# Patient Record
Sex: FEMALE | Race: WHITE | Employment: FULL TIME | URBAN - METROPOLITAN AREA
[De-identification: names, ages, dates, MRNs, and addresses within clinical notes are randomized per-mention and may not be internally consistent; named-entity substitution may affect disease eponyms.]

---

## 2022-05-20 ENCOUNTER — HOSPITAL ENCOUNTER (OUTPATIENT)
Age: 32
Discharge: HOME | End: 2022-05-20
Payer: COMMERCIAL

## 2022-05-20 VITALS
OXYGEN SATURATION: 97 % | DIASTOLIC BLOOD PRESSURE: 70 MMHG | TEMPERATURE: 98 F | SYSTOLIC BLOOD PRESSURE: 120 MMHG | BODY MASS INDEX: 29.44 KG/M2 | RESPIRATION RATE: 19 BRPM | HEART RATE: 83 BPM | HEIGHT: 62 IN | WEIGHT: 160 LBS

## 2022-05-20 DIAGNOSIS — J06.9 VIRAL URI: Primary | ICD-10-CM

## 2022-05-20 LAB
S PYO AG THROAT QL: NEGATIVE
SARS-COV-2 RNA RESP QL NAA+PROBE: NOT DETECTED

## 2022-05-20 PROCEDURE — 99203 OFFICE O/P NEW LOW 30 MIN: CPT | Performed by: NURSE PRACTITIONER

## 2022-05-20 PROCEDURE — U0002 COVID-19 LAB TEST NON-CDC: HCPCS | Performed by: NURSE PRACTITIONER

## 2022-05-20 PROCEDURE — 87880 STREP A ASSAY W/OPTIC: CPT | Performed by: NURSE PRACTITIONER

## 2022-05-20 RX ORDER — DEXAMETHASONE SODIUM PHOSPHATE 4 MG/ML
10 INJECTION, SOLUTION INTRA-ARTICULAR; INTRALESIONAL; INTRAMUSCULAR; INTRAVENOUS; SOFT TISSUE ONCE
Status: COMPLETED | OUTPATIENT
Start: 2022-05-20 | End: 2022-05-20

## 2022-05-20 NOTE — ED INITIAL ASSESSMENT (HPI)
Sore throat w sinus pressure, congestion, post nasal drip w green secretion. Left lower jaw pain - PTA, having CT next week.

## 2022-09-10 ENCOUNTER — HOSPITAL ENCOUNTER (OUTPATIENT)
Dept: CT IMAGING | Facility: HOSPITAL | Age: 32
Discharge: HOME | End: 2022-09-10
Attending: OTOLARYNGOLOGY
Payer: COMMERCIAL

## 2022-09-10 DIAGNOSIS — J32.0 MAXILLARY SINUSITIS, CHRONIC: ICD-10-CM

## 2022-09-10 PROCEDURE — 70486 CT MAXILLOFACIAL W/O DYE: CPT | Performed by: OTOLARYNGOLOGY

## 2022-10-24 ENCOUNTER — OFFICE VISIT (OUTPATIENT)
Dept: INTERNAL MEDICINE CLINIC | Facility: CLINIC | Age: 32
End: 2022-10-24
Payer: COMMERCIAL

## 2022-10-24 VITALS
HEIGHT: 62 IN | DIASTOLIC BLOOD PRESSURE: 66 MMHG | BODY MASS INDEX: 31.28 KG/M2 | HEART RATE: 80 BPM | WEIGHT: 170 LBS | TEMPERATURE: 98 F | OXYGEN SATURATION: 98 % | SYSTOLIC BLOOD PRESSURE: 110 MMHG | RESPIRATION RATE: 16 BRPM

## 2022-10-24 DIAGNOSIS — E55.9 VITAMIN D DEFICIENCY: ICD-10-CM

## 2022-10-24 DIAGNOSIS — Z00.00 ROUTINE PHYSICAL EXAMINATION: Primary | ICD-10-CM

## 2022-10-24 DIAGNOSIS — F43.10 PTSD (POST-TRAUMATIC STRESS DISORDER): ICD-10-CM

## 2022-10-24 DIAGNOSIS — Z00.00 LABORATORY EXAM ORDERED AS PART OF ROUTINE GENERAL MEDICAL EXAMINATION: ICD-10-CM

## 2022-10-24 DIAGNOSIS — K21.9 GASTROESOPHAGEAL REFLUX DISEASE, UNSPECIFIED WHETHER ESOPHAGITIS PRESENT: ICD-10-CM

## 2022-10-24 PROBLEM — O24.419 GESTATIONAL DIABETES MELLITUS (HCC): Status: ACTIVE | Noted: 2022-04-06

## 2022-10-24 PROBLEM — O24.419 GESTATIONAL DIABETES MELLITUS: Status: ACTIVE | Noted: 2022-04-06

## 2022-10-24 PROCEDURE — 3074F SYST BP LT 130 MM HG: CPT | Performed by: PHYSICIAN ASSISTANT

## 2022-10-24 PROCEDURE — 3008F BODY MASS INDEX DOCD: CPT | Performed by: PHYSICIAN ASSISTANT

## 2022-10-24 PROCEDURE — 3078F DIAST BP <80 MM HG: CPT | Performed by: PHYSICIAN ASSISTANT

## 2022-10-24 PROCEDURE — 99385 PREV VISIT NEW AGE 18-39: CPT | Performed by: PHYSICIAN ASSISTANT

## 2022-10-24 RX ORDER — PANTOPRAZOLE SODIUM 40 MG/1
40 TABLET, DELAYED RELEASE ORAL
Qty: 30 TABLET | Refills: 1 | Status: SHIPPED | OUTPATIENT
Start: 2022-10-24

## 2022-10-24 RX ORDER — LEVOCETIRIZINE DIHYDROCHLORIDE 5 MG/1
TABLET, FILM COATED ORAL
COMMUNITY
Start: 2022-10-18

## 2022-10-24 NOTE — PATIENT INSTRUCTIONS
Have labs drawn, fasting 8-10 hours prior (water before is ok).    Start pantoprazole, take once a day before breakfast

## 2022-11-18 ENCOUNTER — MED REC SCAN ONLY (OUTPATIENT)
Dept: INTERNAL MEDICINE CLINIC | Facility: CLINIC | Age: 32
End: 2022-11-18

## 2022-11-19 ENCOUNTER — TELEPHONE (OUTPATIENT)
Dept: INTERNAL MEDICINE CLINIC | Facility: CLINIC | Age: 32
End: 2022-11-19

## 2022-11-19 DIAGNOSIS — U07.1 COVID: Primary | ICD-10-CM

## 2022-11-20 NOTE — TELEPHONE ENCOUNTER
Received call from the patient. She tested positive with Covid today. Symptom onset was two days ago. Experiencing  Headache, fever, body aches, cough, congestion. Paxlovid X 5 days prescribed. Discussed to take antihistamine, Mucinex-D, Flonase, Tylenol. Quarantine time frame discussed. Pt aware if develops SOB, CP to proceed to ED.

## 2022-11-21 ENCOUNTER — TELEPHONE (OUTPATIENT)
Dept: INTERNAL MEDICINE CLINIC | Facility: CLINIC | Age: 32
End: 2022-11-21

## 2022-11-21 NOTE — TELEPHONE ENCOUNTER
Spoke with Bill on 11/19    + covid home test    [facility]    Needs work note

## 2022-12-06 DIAGNOSIS — K21.9 GASTROESOPHAGEAL REFLUX DISEASE, UNSPECIFIED WHETHER ESOPHAGITIS PRESENT: Primary | ICD-10-CM

## 2022-12-06 RX ORDER — PANTOPRAZOLE SODIUM 40 MG/1
TABLET, DELAYED RELEASE ORAL
Qty: 30 TABLET | Refills: 1 | Status: SHIPPED | OUTPATIENT
Start: 2022-12-06

## 2022-12-06 NOTE — TELEPHONE ENCOUNTER
Pantoprazole 40 mg   Last time medication was refilled 11/14/22  Quantity and # of refills 30 tab 1 refill  Last OV 10/24/22 annual exam  Next OV Due 10/23 annual exam

## 2022-12-10 ENCOUNTER — APPOINTMENT (OUTPATIENT)
Dept: GENERAL RADIOLOGY | Age: 32
End: 2022-12-10
Attending: PHYSICIAN ASSISTANT
Payer: COMMERCIAL

## 2022-12-10 ENCOUNTER — HOSPITAL ENCOUNTER (OUTPATIENT)
Age: 32
Discharge: HOME | End: 2022-12-10
Payer: COMMERCIAL

## 2022-12-10 VITALS
OXYGEN SATURATION: 99 % | DIASTOLIC BLOOD PRESSURE: 83 MMHG | RESPIRATION RATE: 16 BRPM | SYSTOLIC BLOOD PRESSURE: 111 MMHG | HEART RATE: 76 BPM

## 2022-12-10 DIAGNOSIS — S60.021A CONTUSION OF RIGHT INDEX FINGER WITHOUT DAMAGE TO NAIL, INITIAL ENCOUNTER: Primary | ICD-10-CM

## 2022-12-10 PROCEDURE — 73140 X-RAY EXAM OF FINGER(S): CPT | Performed by: PHYSICIAN ASSISTANT

## 2022-12-10 PROCEDURE — A4570 SPLINT: HCPCS | Performed by: PHYSICIAN ASSISTANT

## 2022-12-10 PROCEDURE — 99212 OFFICE O/P EST SF 10 MIN: CPT | Performed by: PHYSICIAN ASSISTANT

## 2022-12-10 NOTE — DISCHARGE INSTRUCTIONS
Wear the splint as needed for comfort. Ice and elevate the affected area. Tylenol or ibuprofen as needed for pain. Follow up with your primary doctor. If you have new, changing or worsening symptoms, please go directly to the ER.

## 2022-12-10 NOTE — ED INITIAL ASSESSMENT (HPI)
Slammed door on right index finger one hour ago. Having tingling and severe pain. Unable to tolerate ice.

## 2023-03-27 ENCOUNTER — TELEMEDICINE (OUTPATIENT)
Dept: INTERNAL MEDICINE CLINIC | Facility: CLINIC | Age: 33
End: 2023-03-27
Payer: COMMERCIAL

## 2023-03-27 DIAGNOSIS — U07.1 COVID-19: Primary | ICD-10-CM

## 2023-03-27 PROBLEM — O24.419 GESTATIONAL DIABETES MELLITUS (HCC): Status: RESOLVED | Noted: 2022-04-06 | Resolved: 2023-03-27

## 2023-03-27 PROBLEM — O24.419 GESTATIONAL DIABETES MELLITUS: Status: RESOLVED | Noted: 2022-04-06 | Resolved: 2023-03-27

## 2023-03-27 RX ORDER — AZELASTINE 1 MG/ML
SPRAY, METERED NASAL
COMMUNITY
Start: 2023-03-25

## 2023-03-27 RX ORDER — FLUTICASONE PROPIONATE 50 MCG
SPRAY, SUSPENSION (ML) NASAL
COMMUNITY
Start: 2023-03-25

## 2023-03-27 NOTE — PATIENT INSTRUCTIONS
Continue with Sudafed. Could add antihistamine, Flonase, Mucinex.  Please continue with pushing fluids and rest.

## 2023-11-10 ENCOUNTER — OFFICE VISIT (OUTPATIENT)
Dept: INTERNAL MEDICINE CLINIC | Facility: CLINIC | Age: 33
End: 2023-11-10
Payer: COMMERCIAL

## 2023-11-10 VITALS
OXYGEN SATURATION: 98 % | HEART RATE: 78 BPM | TEMPERATURE: 98 F | DIASTOLIC BLOOD PRESSURE: 72 MMHG | BODY MASS INDEX: 30.32 KG/M2 | WEIGHT: 169 LBS | SYSTOLIC BLOOD PRESSURE: 120 MMHG | HEIGHT: 62.5 IN | RESPIRATION RATE: 16 BRPM

## 2023-11-10 DIAGNOSIS — Z00.00 LABORATORY EXAM ORDERED AS PART OF ROUTINE GENERAL MEDICAL EXAMINATION: ICD-10-CM

## 2023-11-10 DIAGNOSIS — Z13.21 ENCOUNTER FOR VITAMIN DEFICIENCY SCREENING: ICD-10-CM

## 2023-11-10 DIAGNOSIS — Z00.00 ANNUAL PHYSICAL EXAM: Primary | ICD-10-CM

## 2023-11-10 DIAGNOSIS — R10.2 PELVIC PAIN: ICD-10-CM

## 2023-11-10 PROCEDURE — 3008F BODY MASS INDEX DOCD: CPT

## 2023-11-10 PROCEDURE — 3074F SYST BP LT 130 MM HG: CPT

## 2023-11-10 PROCEDURE — 99395 PREV VISIT EST AGE 18-39: CPT

## 2023-11-10 PROCEDURE — 3078F DIAST BP <80 MM HG: CPT

## 2023-11-14 ENCOUNTER — TELEMEDICINE (OUTPATIENT)
Dept: INTERNAL MEDICINE CLINIC | Facility: CLINIC | Age: 33
End: 2023-11-14
Payer: COMMERCIAL

## 2023-11-14 DIAGNOSIS — J06.9 URI, ACUTE: Primary | ICD-10-CM

## 2023-11-14 PROCEDURE — 99213 OFFICE O/P EST LOW 20 MIN: CPT | Performed by: PHYSICIAN ASSISTANT

## 2023-11-14 NOTE — PROGRESS NOTES
Shi Toribio is a 35year old female. HPI:   This visit is conducted using Telemedicine with live, interactive video and audio. Patient consents to video visit today to discuss   Cough, congestion, sore throat, had difficulty swallowing, postnasal drainage, right ear pain, now today with chills and sweats. Robitussin not helping   Did not test for COVID yet     Current Outpatient Medications   Medication Sig Dispense Refill    levocetirizine 5 MG Oral Tab         Past Medical History:   Diagnosis Date    Allergies     MOLD    Anxiety     Gestational diabetes     Gestational diabetes mellitus 4/6/2022    PTSD (post-traumatic stress disorder)       Social History:  Social History     Socioeconomic History    Marital status:    Tobacco Use    Smoking status: Never    Smokeless tobacco: Never   Vaping Use    Vaping Use: Never used   Substance and Sexual Activity    Alcohol use: Not Currently    Drug use: Never        REVIEW OF SYSTEMS:   GENERAL HEALTH: feels well otherwise. Denies fever, chills, unintentional weight change  SKIN: denies any unusual skin lesions or rashes  RESPIRATORY: denies hemoptysis, shortness of breath with exertion, wheezing or cough  CARDIOVASCULAR: denies chest pain or palpitations, denies leg swelling  GI: denies abdominal pain and denies heartburn. Denies nausea, vomiting, diarrhea, constipation  NEURO: denies headaches, dizziness, weakness, syncope    EXAM:   No vitals for video visit  Physical Exam  - well appearing via video visit  - no visible rash or diaphoresis  - no respiratory distress or cough observed  - able to speak in full sentences  - alert and oriented        ASSESSMENT AND PLAN:   1. URI, acute (Primary)   Instructed on supportive care, tylenol, sudafed, nasal spray  Take home COVID test  If negative and not feeling improved will add on tamiflu      The patient indicates understanding of these issues and agrees to the plan.   Return if symptoms worsen or fail to improve.

## 2023-11-14 NOTE — PATIENT INSTRUCTIONS
Tylenol 1000mg every 8 hours as needed, and sudafed (pseudoephedrine) as directed for drainage   Ibuprofen 600mg   Nasal spray once a day

## 2023-11-16 ENCOUNTER — TELEPHONE (OUTPATIENT)
Dept: ORTHOPEDICS CLINIC | Facility: CLINIC | Age: 33
End: 2023-11-16

## 2023-11-16 ENCOUNTER — HOSPITAL ENCOUNTER (OUTPATIENT)
Age: 33
Discharge: HOME | End: 2023-11-16
Payer: COMMERCIAL

## 2023-11-16 ENCOUNTER — APPOINTMENT (OUTPATIENT)
Dept: GENERAL RADIOLOGY | Age: 33
End: 2023-11-16
Attending: NURSE PRACTITIONER
Payer: COMMERCIAL

## 2023-11-16 VITALS
DIASTOLIC BLOOD PRESSURE: 91 MMHG | HEIGHT: 62 IN | BODY MASS INDEX: 29.44 KG/M2 | WEIGHT: 160 LBS | SYSTOLIC BLOOD PRESSURE: 141 MMHG | RESPIRATION RATE: 22 BRPM | HEART RATE: 68 BPM | OXYGEN SATURATION: 99 % | TEMPERATURE: 98 F

## 2023-11-16 DIAGNOSIS — S62.663A CLOSED NONDISPLACED FRACTURE OF DISTAL PHALANX OF LEFT MIDDLE FINGER, INITIAL ENCOUNTER: Primary | ICD-10-CM

## 2023-11-16 DIAGNOSIS — S69.92XA INJURY OF FINGER OF LEFT HAND, INITIAL ENCOUNTER: ICD-10-CM

## 2023-11-16 PROCEDURE — 99213 OFFICE O/P EST LOW 20 MIN: CPT | Performed by: NURSE PRACTITIONER

## 2023-11-16 PROCEDURE — 73130 X-RAY EXAM OF HAND: CPT | Performed by: NURSE PRACTITIONER

## 2023-11-16 PROCEDURE — A4570 SPLINT: HCPCS | Performed by: NURSE PRACTITIONER

## 2023-11-16 RX ORDER — IBUPROFEN 800 MG/1
800 TABLET ORAL ONCE
Status: COMPLETED | OUTPATIENT
Start: 2023-11-16 | End: 2023-11-16

## 2023-11-16 NOTE — TELEPHONE ENCOUNTER
Can you see patient? When? DOI:11/16/23  CONCLUSION:  There is soft tissue swelling about the left 3rd digit. There is an oblique fracture involving the base of the distal phalanx of the left 3rd finger extending to the DIP joint.

## 2023-11-17 NOTE — TELEPHONE ENCOUNTER
Patient scheduled, please be advised.             Future Appointments   Date Time Provider [place]   11/20/2023  6:30 PM Collin Chowdary PA-C [facility] [address]   11/21/2023 10:20 AM MORAIMA Telles [facility] [id number]

## 2023-11-20 ENCOUNTER — OFFICE VISIT (OUTPATIENT)
Dept: INTERNAL MEDICINE CLINIC | Facility: CLINIC | Age: 33
End: 2023-11-20
Payer: COMMERCIAL

## 2023-11-20 VITALS
SYSTOLIC BLOOD PRESSURE: 118 MMHG | TEMPERATURE: 97 F | RESPIRATION RATE: 16 BRPM | HEIGHT: 62 IN | BODY MASS INDEX: 31.1 KG/M2 | DIASTOLIC BLOOD PRESSURE: 80 MMHG | HEART RATE: 79 BPM | OXYGEN SATURATION: 98 % | WEIGHT: 169 LBS

## 2023-11-20 DIAGNOSIS — G44.229 CHRONIC TENSION-TYPE HEADACHE, NOT INTRACTABLE: Primary | ICD-10-CM

## 2023-11-20 DIAGNOSIS — S62.639A: ICD-10-CM

## 2023-11-20 PROBLEM — F32.A DEPRESSION: Status: ACTIVE | Noted: 2023-11-16

## 2023-11-20 PROBLEM — G43.909 MIGRAINE: Status: ACTIVE | Noted: 2023-11-16

## 2023-11-20 PROCEDURE — 3008F BODY MASS INDEX DOCD: CPT | Performed by: PHYSICIAN ASSISTANT

## 2023-11-20 PROCEDURE — 99214 OFFICE O/P EST MOD 30 MIN: CPT | Performed by: PHYSICIAN ASSISTANT

## 2023-11-20 PROCEDURE — 3079F DIAST BP 80-89 MM HG: CPT | Performed by: PHYSICIAN ASSISTANT

## 2023-11-20 PROCEDURE — 3074F SYST BP LT 130 MM HG: CPT | Performed by: PHYSICIAN ASSISTANT

## 2023-11-21 ENCOUNTER — OFFICE VISIT (OUTPATIENT)
Dept: ORTHOPEDICS CLINIC | Facility: CLINIC | Age: 33
End: 2023-11-21
Payer: COMMERCIAL

## 2023-11-21 VITALS — HEIGHT: 62 IN | WEIGHT: 160 LBS | BODY MASS INDEX: 29.44 KG/M2

## 2023-11-21 DIAGNOSIS — S62.639A: Primary | ICD-10-CM

## 2023-11-21 PROCEDURE — 99213 OFFICE O/P EST LOW 20 MIN: CPT | Performed by: PHYSICIAN ASSISTANT

## 2023-11-21 PROCEDURE — 3008F BODY MASS INDEX DOCD: CPT | Performed by: PHYSICIAN ASSISTANT

## 2023-12-08 ENCOUNTER — LAB ENCOUNTER (OUTPATIENT)
Dept: LAB | Age: 33
End: 2023-12-08
Payer: COMMERCIAL

## 2023-12-08 DIAGNOSIS — Z13.21 ENCOUNTER FOR VITAMIN DEFICIENCY SCREENING: ICD-10-CM

## 2023-12-08 DIAGNOSIS — Z00.00 LABORATORY EXAM ORDERED AS PART OF ROUTINE GENERAL MEDICAL EXAMINATION: ICD-10-CM

## 2023-12-08 LAB
BILIRUB UR QL STRIP.AUTO: NEGATIVE
CLARITY UR REFRACT.AUTO: CLEAR
GLUCOSE UR STRIP.AUTO-MCNC: NORMAL MG/DL
KETONES UR STRIP.AUTO-MCNC: NEGATIVE MG/DL
LEUKOCYTE ESTERASE UR QL STRIP.AUTO: 75
NITRITE UR QL STRIP.AUTO: NEGATIVE
PH UR STRIP.AUTO: 7.5 [PH] (ref 5–8)
RBC UR QL AUTO: NEGATIVE
SP GR UR STRIP.AUTO: 1.03 (ref 1–1.03)
UROBILINOGEN UR STRIP.AUTO-MCNC: NORMAL MG/DL

## 2023-12-08 PROCEDURE — 81001 URINALYSIS AUTO W/SCOPE: CPT

## 2023-12-11 ENCOUNTER — TELEPHONE (OUTPATIENT)
Dept: INTERNAL MEDICINE CLINIC | Facility: CLINIC | Age: 33
End: 2023-12-11

## 2023-12-11 DIAGNOSIS — R10.2 PELVIC PAIN: Primary | ICD-10-CM

## 2023-12-11 DIAGNOSIS — R10.9 LEFT SIDED ABDOMINAL PAIN: ICD-10-CM

## 2024-01-01 ENCOUNTER — MED REC SCAN ONLY (OUTPATIENT)
Dept: ORTHOPEDICS CLINIC | Facility: CLINIC | Age: 34
End: 2024-01-01

## 2024-01-10 ENCOUNTER — LAB ENCOUNTER (OUTPATIENT)
Dept: LAB | Age: 34
End: 2024-01-10
Payer: COMMERCIAL

## 2024-01-10 ENCOUNTER — OFFICE VISIT (OUTPATIENT)
Dept: FAMILY MEDICINE CLINIC | Facility: CLINIC | Age: 34
End: 2024-01-10
Payer: COMMERCIAL

## 2024-01-10 ENCOUNTER — OFFICE VISIT (OUTPATIENT)
Dept: ORTHOPEDICS CLINIC | Facility: CLINIC | Age: 34
End: 2024-01-10
Payer: COMMERCIAL

## 2024-01-10 VITALS
WEIGHT: 168 LBS | HEART RATE: 62 BPM | DIASTOLIC BLOOD PRESSURE: 74 MMHG | SYSTOLIC BLOOD PRESSURE: 116 MMHG | HEIGHT: 62 IN | BODY MASS INDEX: 30.91 KG/M2 | TEMPERATURE: 98 F | RESPIRATION RATE: 18 BRPM | OXYGEN SATURATION: 96 %

## 2024-01-10 DIAGNOSIS — S62.639A: Primary | ICD-10-CM

## 2024-01-10 DIAGNOSIS — H66.003 NON-RECURRENT ACUTE SUPPURATIVE OTITIS MEDIA OF BOTH EARS WITHOUT SPONTANEOUS RUPTURE OF TYMPANIC MEMBRANES: Primary | ICD-10-CM

## 2024-01-10 DIAGNOSIS — Z13.21 ENCOUNTER FOR VITAMIN DEFICIENCY SCREENING: ICD-10-CM

## 2024-01-10 DIAGNOSIS — Z00.00 LABORATORY EXAM ORDERED AS PART OF ROUTINE GENERAL MEDICAL EXAMINATION: ICD-10-CM

## 2024-01-10 LAB
ALBUMIN SERPL-MCNC: 3.8 G/DL (ref 3.4–5)
ALBUMIN/GLOB SERPL: 1.2 {RATIO} (ref 1–2)
ALP LIVER SERPL-CCNC: 75 U/L
ALT SERPL-CCNC: 21 U/L
ANION GAP SERPL CALC-SCNC: 1 MMOL/L (ref 0–18)
AST SERPL-CCNC: 11 U/L (ref 15–37)
BASOPHILS # BLD AUTO: 0.06 X10(3) UL (ref 0–0.2)
BASOPHILS NFR BLD AUTO: 0.7 %
BILIRUB SERPL-MCNC: 0.5 MG/DL (ref 0.1–2)
BUN BLD-MCNC: 13 MG/DL (ref 9–23)
CALCIUM BLD-MCNC: 8.8 MG/DL (ref 8.5–10.1)
CHLORIDE SERPL-SCNC: 108 MMOL/L (ref 98–112)
CHOLEST SERPL-MCNC: 242 MG/DL (ref ?–200)
CO2 SERPL-SCNC: 27 MMOL/L (ref 21–32)
CREAT BLD-MCNC: 1.12 MG/DL
EGFRCR SERPLBLD CKD-EPI 2021: 67 ML/MIN/1.73M2 (ref 60–?)
EOSINOPHIL # BLD AUTO: 0.09 X10(3) UL (ref 0–0.7)
EOSINOPHIL NFR BLD AUTO: 1.1 %
ERYTHROCYTE [DISTWIDTH] IN BLOOD BY AUTOMATED COUNT: 13.9 %
FASTING PATIENT LIPID ANSWER: YES
FASTING STATUS PATIENT QL REPORTED: YES
GLOBULIN PLAS-MCNC: 3.3 G/DL (ref 2.8–4.4)
GLUCOSE BLD-MCNC: 110 MG/DL (ref 70–99)
HCT VFR BLD AUTO: 41.1 %
HDLC SERPL-MCNC: 38 MG/DL (ref 40–59)
HGB BLD-MCNC: 12.8 G/DL
IMM GRANULOCYTES # BLD AUTO: 0.03 X10(3) UL (ref 0–1)
IMM GRANULOCYTES NFR BLD: 0.4 %
LDLC SERPL CALC-MCNC: 174 MG/DL (ref ?–100)
LYMPHOCYTES # BLD AUTO: 2.35 X10(3) UL (ref 1–4)
LYMPHOCYTES NFR BLD AUTO: 28.8 %
MCH RBC QN AUTO: 27.4 PG (ref 26–34)
MCHC RBC AUTO-ENTMCNC: 31.1 G/DL (ref 31–37)
MCV RBC AUTO: 88 FL
MONOCYTES # BLD AUTO: 0.71 X10(3) UL (ref 0.1–1)
MONOCYTES NFR BLD AUTO: 8.7 %
NEUTROPHILS # BLD AUTO: 4.92 X10 (3) UL (ref 1.5–7.7)
NEUTROPHILS # BLD AUTO: 4.92 X10(3) UL (ref 1.5–7.7)
NEUTROPHILS NFR BLD AUTO: 60.3 %
NONHDLC SERPL-MCNC: 204 MG/DL (ref ?–130)
OSMOLALITY SERPL CALC.SUM OF ELEC: 283 MOSM/KG (ref 275–295)
PLATELET # BLD AUTO: 398 10(3)UL (ref 150–450)
POTASSIUM SERPL-SCNC: 4 MMOL/L (ref 3.5–5.1)
PROT SERPL-MCNC: 7.1 G/DL (ref 6.4–8.2)
RBC # BLD AUTO: 4.67 X10(6)UL
SODIUM SERPL-SCNC: 136 MMOL/L (ref 136–145)
TRIGL SERPL-MCNC: 162 MG/DL (ref 30–149)
TSI SER-ACNC: 1.83 MIU/ML (ref 0.36–3.74)
VIT D+METAB SERPL-MCNC: 7 NG/ML (ref 30–100)
VLDLC SERPL CALC-MCNC: 33 MG/DL (ref 0–30)
WBC # BLD AUTO: 8.2 X10(3) UL (ref 4–11)

## 2024-01-10 PROCEDURE — 99213 OFFICE O/P EST LOW 20 MIN: CPT | Performed by: PHYSICIAN ASSISTANT

## 2024-01-10 PROCEDURE — 36415 COLL VENOUS BLD VENIPUNCTURE: CPT

## 2024-01-10 PROCEDURE — 85025 COMPLETE CBC W/AUTO DIFF WBC: CPT

## 2024-01-10 PROCEDURE — 84443 ASSAY THYROID STIM HORMONE: CPT

## 2024-01-10 PROCEDURE — 80061 LIPID PANEL: CPT

## 2024-01-10 PROCEDURE — 99213 OFFICE O/P EST LOW 20 MIN: CPT | Performed by: NURSE PRACTITIONER

## 2024-01-10 PROCEDURE — 3078F DIAST BP <80 MM HG: CPT | Performed by: NURSE PRACTITIONER

## 2024-01-10 PROCEDURE — 82306 VITAMIN D 25 HYDROXY: CPT

## 2024-01-10 PROCEDURE — 80053 COMPREHEN METABOLIC PANEL: CPT

## 2024-01-10 PROCEDURE — 3074F SYST BP LT 130 MM HG: CPT | Performed by: NURSE PRACTITIONER

## 2024-01-10 PROCEDURE — 3008F BODY MASS INDEX DOCD: CPT | Performed by: NURSE PRACTITIONER

## 2024-01-10 RX ORDER — AMOXICILLIN 875 MG/1
875 TABLET, COATED ORAL 2 TIMES DAILY
Qty: 20 TABLET | Refills: 0 | Status: SHIPPED | OUTPATIENT
Start: 2024-01-10 | End: 2024-01-20

## 2024-01-10 NOTE — PROGRESS NOTES
CHIEF COMPLAINT:     Chief Complaint   Patient presents with    Sinus Problem     Symptoms started last week : Cough, fullness on both ears ,  and Sinus pressure.   OTC: Robutassin Dm and mucinex        HPI:   Gale Taylor is a 33 year old female who presents to clinic today with complaints of right ear pain. Has had for 1  week. Pain is described as fullness.  Patient reports history of ear infections.  Associated symptoms: cold symptoms x 1 weeks, yellow mucus. Denies facial pain or headache. Denies hearing loss, ear drainage.    Home treatment includes mucinex and robitussin.     Current Outpatient Medications   Medication Sig Dispense Refill    sertraline 50 MG Oral Tab Take 1 tablet (50 mg total) by mouth daily.      amoxicillin 875 MG Oral Tab Take 1 tablet (875 mg total) by mouth 2 (two) times daily for 10 days. 20 tablet 0    levocetirizine 5 MG Oral Tab       aspirin-acetaminophen-caffeine 250-250-65 MG Oral Tab Take 1 tablet by mouth every 6 (six) hours as needed for Pain. (Patient not taking: Reported on 1/10/2024)        Past Medical History:   Diagnosis Date    Allergies     MOLD    Anxiety     Gestational diabetes     Gestational diabetes mellitus 4/6/2022    PTSD (post-traumatic stress disorder)       Social History:  Social History     Socioeconomic History    Marital status:    Tobacco Use    Smoking status: Never    Smokeless tobacco: Never   Vaping Use    Vaping Use: Never used   Substance and Sexual Activity    Alcohol use: Not Currently    Drug use: Never        REVIEW OF SYSTEMS:   GENERAL: Feeling well otherwise.    SKIN: no unusual skin lesions or rashes  HEENT: See HPI  LUNGS: No cough, shortness of breath, or wheezing.  CARDIOVASCULAR: No chest pain, palpitations  GI: No N/V/C/D.  NEURO: denies headaches or dizziness    EXAM:   /74   Pulse 62   Temp 97.5 °F (36.4 °C)   Resp 18   Ht 5' 2\" (1.575 m)   Wt 168 lb (76.2 kg)   LMP 01/06/2024 (Exact Date)   SpO2 96%    BMI 30.73 kg/m²   GENERAL: well developed, well nourished,in no apparent distress  SKIN: no rashes,no suspicious lesions  HEAD: atraumatic, normocephalic  EYES: conjunctiva clear, EOM intact  EARS: Tragus non tender on palpation bilaterally. External auditory canals healthy.   Right TM: erythematous, + bulging, no retraction, no effusion, bony landmarks intact.   Left TM: erythematous, + bulging, no retraction,no effusion, bony landmarks intact.  NOSE: nostrils patent, clear nasal discharge, nasal mucosa pink and noninflamed, no sinus tenderness  THROAT: oral mucosa pink, moist. Posterior pharynx is not erythematous or injected. No exudates.  NECK: supple, non-tender  LUNGS: clear to auscultation bilaterally, no wheezes or rhonchi. No diminished breath sounds. Breathing is non labored.  CARDIO: RRR without murmur  EXTREMITIES: no cyanosis, clubbing or edema  LYMPH: no auricular lymphadenopathy; bilateral anterior cervical lymphadenopathy.      ASSESSMENT AND PLAN:   Gale Taylor is a 33 year old female who presents with:    ASSESSMENT:  Encounter Diagnosis   Name Primary?    Non-recurrent acute suppurative otitis media of both ears without spontaneous rupture of tympanic membranes Yes     1. Non-recurrent acute suppurative otitis media of both ears without spontaneous rupture of tympanic membranes  - amoxicillin 875 MG Oral Tab; Take 1 tablet (875 mg total) by mouth 2 (two) times daily for 10 days.  Dispense: 20 tablet; Refill: 0  Pt concerned for yeast infection. Start monistat if symptoms develop.   Will use amox. F/u with PCP after antibiotic, sooner if worsening.     PLAN: Meds and instructions as listed below.  Tylenol/Motrin prn pain.    Comfort measures as described in Patient Instructions.    Risks, benefits, and side effects of medication explained and discussed.  Stressed importance of completing full course of antibiotic.   Follow up with PCP if s/sx worsen, do not improve in 3 days, or if fever of  100.4 or greater persists for 72 hrs.    Requested Prescriptions     Signed Prescriptions Disp Refills    amoxicillin 875 MG Oral Tab 20 tablet 0     Sig: Take 1 tablet (875 mg total) by mouth 2 (two) times daily for 10 days.       See pt handout    Patient voiced understanding and is in agreement with treatment plan

## 2024-01-10 NOTE — PROGRESS NOTES
[facility] - ORTHOPEDICS  [address] [place] [id number]  [phone]       Name: Gale Taylor   MRN: [id number]  Date: 1/10/2024     REASON FOR VISIT: Follow up for  left third finger distal phalanx base fracture.      INTERVAL HISTORY:  Gale Taylor is a 33 year old female who returns for evaluation of  left third finger distal phalanx base fracture.      Overall, the patient is doing well.  0/10 pain.       ROS: ROS    PE:   There were no vitals filed for this visit.  Estimated body mass index is 30.73 kg/m² as calculated from the following:    Height as of an earlier encounter on 1/10/24: 5' 2\" (1.575 m).    Weight as of an earlier encounter on 1/10/24: 168 lb (76.2 kg).    Physical Exam  Constitutional:       Appearance: Normal appearance.   HENT:      Head: Normocephalic and atraumatic.   Eyes:      Extraocular Movements: Extraocular movements intact.   Neck:      Musculoskeletal: Normal range of motion and neck supple.   Cardiovascular:      Pulses: Normal pulses.   Pulmonary:      Effort: Pulmonary effort is normal. No respiratory distress.   Abdominal:      General: There is no distension.   Skin:     General: Skin is warm.      Capillary Refill: Capillary refill takes less than 2 seconds.      Findings: No bruising.   Neurological:      General: No focal deficit present.      Mental Status: She is alert.   Psychiatric:         Mood and Affect: Mood normal.     Examination of the left hand/wrist demonstrates:     Skin is intact, warm and dry.     Inspection:   Atrophy: none    Effusion: none    Edema: none    Erythema: none    Hematoma: none    Nail changes:  none    Deformities: none      Palpation:   Radius: none    Ulna: none    Scaphoid: none    Lunate:  none    Triquetrum: none    Pisiform: none    Trapezium: none    Trapezoid: none    Capitate: none  Hamate: none   DRUJ: none   Dorsal Wrist: none   Guerrero Wrist: none   Metacarpals none   DIP: none   PIP: none      ROM:   Wrist: Full and symmetric   Fingers: Full and symmetric     Strength: normal     Special Tests:   Median Nerve: Negative  Ulnar Nerve: Negative   Radial Nerve:  Negative     No obvious peripheral edema noted.   Distal neurovascular exam demonstrates normal perfusion, intact sensation to light touch and full strength.     Examination of the contralateral hand/wrist demonstrates:  No significant atrophy, swelling or effusion. Full range of motion. Neurovascularly intact distally.      IMPRESSION: Gale Taylor is a 33 year old female who presented for follow up of   left third finger distal phalanx base fracture.      PLAN:   We had a detailed discussion outlining the etiology, anatomy, pathophysiology, and natural history of the patient's findings.    We reviewed the treatment of this disease condition.  The patient notes near complete resolution of symptoms, and return to full baseline function. The patient can follow up with our office as needed. The patient had the opportunity to ask questions, and all questions were answered appropriately.       FOLLOW-UP:  Return to clinic on an as needed basis.             Rama Blank, [facility], PA-C Orthopedic Surgery / Sports Medicine Specialist  [facility]opaedic Surgery  [address] [place] [email or web address]  [email or web address]  t: [phone]  o: [phone]  f: [phone]    This note was dictated using Dragon software.  While it was briefly proofread prior to completion, some grammatical, spelling, and word choice errors due to dictation may still occur.

## 2024-01-11 NOTE — PROGRESS NOTES
Per Misty, Please let patient know just complete the CT. Did not realized she had it scheduled.    Spoke to pt. Made aware of above recommendations. Pt voiced understanding.  No Ultrasound was ordered
Spoke to pt. Made aware of results & recommendations. Pt voiced understanding.  Pt has scheduled CT abd and pelvis and wanted to know if she needs to do Ultrasound before or after the CT scan and if she still continue with CT  Advised we will check with Misty Galvan and get back to her  Pt v/u  Routed to Misty LEPE for further recommendations
12-16 weeks

## 2024-01-17 ENCOUNTER — OFFICE VISIT (OUTPATIENT)
Dept: INTERNAL MEDICINE CLINIC | Facility: CLINIC | Age: 34
End: 2024-01-17
Payer: COMMERCIAL

## 2024-01-17 VITALS
BODY MASS INDEX: 31.28 KG/M2 | TEMPERATURE: 97 F | OXYGEN SATURATION: 98 % | WEIGHT: 170 LBS | DIASTOLIC BLOOD PRESSURE: 60 MMHG | SYSTOLIC BLOOD PRESSURE: 112 MMHG | HEIGHT: 62 IN | HEART RATE: 70 BPM | RESPIRATION RATE: 16 BRPM

## 2024-01-17 DIAGNOSIS — H65.93 FLUID LEVEL BEHIND TYMPANIC MEMBRANE OF BOTH EARS: Primary | ICD-10-CM

## 2024-01-17 PROCEDURE — 3074F SYST BP LT 130 MM HG: CPT

## 2024-01-17 PROCEDURE — 3078F DIAST BP <80 MM HG: CPT

## 2024-01-17 PROCEDURE — 99213 OFFICE O/P EST LOW 20 MIN: CPT

## 2024-01-17 PROCEDURE — 3008F BODY MASS INDEX DOCD: CPT

## 2024-01-17 RX ORDER — PREDNISONE 20 MG/1
40 TABLET ORAL DAILY
Qty: 10 TABLET | Refills: 0 | Status: SHIPPED | OUTPATIENT
Start: 2024-01-17 | End: 2024-01-22

## 2024-01-17 NOTE — PATIENT INSTRUCTIONS
Finish antibiotics as prescribed.  Start taking Prednisone two tablets in the morning with food.

## 2024-01-17 NOTE — PROGRESS NOTES
Gale Taylor is a 33 year old female.  HPI:   HPI   Pt presents today for ear infection f/u. Was treated with Amoxicillin, still has two days to complete the therapy. Feels better, still feeling some pressure.  Denies fever, body aches, chills.   Past Medical History:   Diagnosis Date    Allergies     MOLD    Anxiety     Gestational diabetes     Gestational diabetes mellitus 4/6/2022    PTSD (post-traumatic stress disorder)       Social History:  Social History     Socioeconomic History    Marital status:    Tobacco Use    Smoking status: Never    Smokeless tobacco: Never   Vaping Use    Vaping Use: Never used   Substance and Sexual Activity    Alcohol use: Not Currently    Drug use: Never        REVIEW OF SYSTEMS:   Review of Systems   Constitutional:  Positive for fatigue. Negative for chills, diaphoresis and fever.   HENT:  Positive for congestion. Negative for ear discharge, hearing loss, postnasal drip, rhinorrhea, sinus pressure, sinus pain and sore throat. Ear pain: ear pressure.   Eyes: Negative.    Respiratory: Negative.     Cardiovascular: Negative.    Psychiatric/Behavioral: Negative.           EXAM:   /60   Pulse 70   Temp 96.9 °F (36.1 °C)   Resp 16   Ht 5' 2\" (1.575 m)   Wt 170 lb (77.1 kg)   LMP 01/06/2024 (Exact Date)   SpO2 98%   BMI 31.09 kg/m²   Physical Exam  Vitals and nursing note reviewed.   Constitutional:       Appearance: Normal appearance. She is normal weight.   HENT:      Right Ear: A middle ear effusion is present. Tympanic membrane is not injected, erythematous or bulging.      Left Ear: A middle ear effusion is present. Tympanic membrane is not injected, erythematous or bulging.      Nose: Mucosal edema present.      Right Sinus: No maxillary sinus tenderness or frontal sinus tenderness.      Left Sinus: No maxillary sinus tenderness or frontal sinus tenderness.      Mouth/Throat:      Mouth: Mucous membranes are moist.      Pharynx: Oropharynx is clear. Uvula  midline.      Tonsils: No tonsillar exudate or tonsillar abscesses.   Cardiovascular:      Rate and Rhythm: Normal rate and regular rhythm.      Pulses: Normal pulses.      Heart sounds: Normal heart sounds.   Pulmonary:      Effort: Pulmonary effort is normal. No respiratory distress.      Breath sounds: Normal breath sounds. No stridor. No wheezing, rhonchi or rales.   Chest:      Chest wall: No tenderness.   Skin:     General: Skin is warm and dry.      Capillary Refill: Capillary refill takes less than 2 seconds.   Neurological:      General: No focal deficit present.      Mental Status: She is alert and oriented to person, place, and time. Mental status is at baseline.   Psychiatric:         Mood and Affect: Mood normal.         Behavior: Behavior normal.         Thought Content: Thought content normal.         Judgment: Judgment normal.          ASSESSMENT AND PLAN:   Diagnoses and all orders for this visit:    Fluid level behind tympanic membrane of both ears  -     predniSONE 20 MG Oral Tab; Take 2 tablets (40 mg total) by mouth daily for 5 days.       Requested Prescriptions     Signed Prescriptions Disp Refills    predniSONE 20 MG Oral Tab 10 tablet 0     Sig: Take 2 tablets (40 mg total) by mouth daily for 5 days.         The patient indicates understanding of these issues and agrees to the plan.  The patient is asked to return if symptoms persist.

## 2024-01-19 ENCOUNTER — HOSPITAL ENCOUNTER (OUTPATIENT)
Dept: CT IMAGING | Facility: HOSPITAL | Age: 34
Discharge: HOME | End: 2024-01-19
Payer: COMMERCIAL

## 2024-01-19 DIAGNOSIS — R10.9 LEFT SIDED ABDOMINAL PAIN: ICD-10-CM

## 2024-01-19 DIAGNOSIS — R10.2 PELVIC PAIN: ICD-10-CM

## 2024-01-19 PROCEDURE — 74177 CT ABD & PELVIS W/CONTRAST: CPT

## 2024-01-19 RX ORDER — IOHEXOL 350 MG/ML
100 INJECTION, SOLUTION INTRAVENOUS
Status: COMPLETED | OUTPATIENT
Start: 2024-01-19 | End: 2024-01-19

## 2024-01-19 RX ADMIN — IOHEXOL 100 ML: 350 INJECTION, SOLUTION INTRAVENOUS at 19:59:00

## 2024-01-22 DIAGNOSIS — R93.5 ABNORMAL ABDOMINAL CT SCAN: ICD-10-CM

## 2024-01-22 DIAGNOSIS — K31.89 GASTRIC WALL THICKENING: Primary | ICD-10-CM

## 2024-03-29 PROBLEM — R10.32 ABDOMINAL PAIN, LEFT LOWER QUADRANT: Status: ACTIVE | Noted: 2024-03-29

## 2024-03-29 PROBLEM — R93.5 ABNORMAL CT OF THE ABDOMEN: Status: ACTIVE | Noted: 2024-03-29

## 2024-03-29 PROBLEM — R12 HEARTBURN: Status: ACTIVE | Noted: 2024-03-29

## 2024-03-29 PROBLEM — R10.9 ABDOMINAL PAIN: Status: ACTIVE | Noted: 2024-03-29

## 2024-03-29 PROBLEM — D12.2 BENIGN NEOPLASM OF ASCENDING COLON: Status: ACTIVE | Noted: 2024-03-29

## 2024-03-29 PROBLEM — K59.00 CONSTIPATION: Status: ACTIVE | Noted: 2024-03-29

## 2024-06-15 NOTE — PROGRESS NOTES
Gale Taylor is a 34 year old female.  HPI:   HPI   Pt presents today with c/o right ear pressure. Denies pain, fever, rhinorrhea. Last week the pressure was worse after flying. Used Flonase for few days and it fell better. Taking Xyzal daily.    Completed vitamin D therapy. Did not rechecked the labs.  Current Outpatient Medications   Medication Sig Dispense Refill    Sertraline HCl 150 MG Oral Cap Take by mouth.      VYVANSE 50 MG Oral Cap       hydrOXYzine 25 MG Oral Tab TAKE ONE TABLET DAILY AS NEEDED FOR ANXIETY. MAY TAKE 2 TABLETS NIGHTLY AS NEEDED FOR ANXIETY/SLEEP      Omeprazole 40 MG Oral Capsule Delayed Release Take 1 capsule (40 mg total) by mouth daily. 90 capsule 3    levocetirizine 5 MG Oral Tab         Past Medical History:    Abdominal pain    Allergies    MOLD    Anxiety    Back pain    Belching    Bloating    Sometimes feeling pain and full    Constipation    Fatigue    Recent blood test confirmed very low Vitamin D    Flatulence/gas pain/belching    More often    Food intolerance    Change when I drink milk now as of proabbly june 2023    Gestational diabetes (HCC)    Gestational diabetes mellitus (HCC)    Headache disorder    Yes sometimes but now inky a hanful since I started sertraline    Heartburn    Started last year where i get in my sleep.I sleep better on my left rather than right    Heavy menses    Days 2-4    High cholesterol    Rexent blood test in jan 2024    Indigestion    Sometimes stomach cramping/bloat/gas    Menses painful    Occasionally    PTSD (post-traumatic stress disorder)    Sleep disturbance    Since 2015ish but worse the past year    Stress    Uncomfortable fullness after meals    Weight gain    At appt Oct/Nov that I am now 170lbs      Social History:  Social History     Socioeconomic History    Marital status:    Tobacco Use    Smoking status: Never    Smokeless tobacco: Never   Vaping Use    Vaping status: Never Used   Substance and Sexual Activity     Alcohol use: Not Currently    Drug use: Never     Social Determinants of Health      Received from [employer], [facility]        REVIEW OF SYSTEMS:   GENERAL HEALTH: feels well otherwise. Denies fever, chills, unintentional weight change  SKIN: denies any unusual skin lesions or rashes  RESPIRATORY: denies shortness of breath with exertion, denies cough or wheezing  CARDIOVASCULAR: denies chest pain or palpitations, denies leg swelling  GI: denies abdominal pain and denies heartburn. Denies nausea, vomiting, diarrhea, constipation  NEURO: denies headaches, dizziness, weakness, syncope  PSYCH: denies anxiety, depression, insomnia    EXAM:   /78   Pulse 87   Resp 18   Ht 5' 2\" (1.575 m)   Wt 174 lb (78.9 kg)   LMP 01/06/2024 (Exact Date)   SpO2 96%   BMI 31.83 kg/m²   GENERAL: well developed, well nourished,in no apparent distress  SKIN: no rashes,no suspicious lesions, warm and dry  HEENT: atraumatic, normocephalic,ears and throat are clear, + right ear effusion  NECK: supple,no adenopathy, no thyromegaly  LUNGS: clear to auscultation b/l no W/R/R  CARDIO: RRR without murmur  GI: good BS's,no masses, HSM, distension or tenderness  EXTREMITIES: no cyanosis, clubbing or edema  MUSCULOSKELETAL: FROM, no joint swelling or bony tenderness  NEURO: a/ox3, no focal deficits  PSYCH: mood and affect normal    ASSESSMENT AND PLAN:     Encounter Diagnoses   Name Primary?    Dysfunction of right eustachian tube  -continue with daily Xyzal. Continue to use Flonase 2 sprays each nostril per day x 1 week. Yes    Vitamin D deficiency  -recheck vitamin D level      Requested Prescriptions      No prescriptions requested or ordered in this encounter         The patient indicates understanding of these issues and agrees to the plan.  The patient is asked to return if symptoms persist or worsen.

## 2024-06-17 ENCOUNTER — OFFICE VISIT (OUTPATIENT)
Dept: INTERNAL MEDICINE CLINIC | Facility: CLINIC | Age: 34
End: 2024-06-17
Payer: COMMERCIAL

## 2024-06-17 ENCOUNTER — LAB ENCOUNTER (OUTPATIENT)
Dept: LAB | Age: 34
End: 2024-06-17
Attending: INTERNAL MEDICINE

## 2024-06-17 VITALS
HEIGHT: 62 IN | RESPIRATION RATE: 18 BRPM | HEART RATE: 87 BPM | SYSTOLIC BLOOD PRESSURE: 122 MMHG | DIASTOLIC BLOOD PRESSURE: 78 MMHG | OXYGEN SATURATION: 96 % | BODY MASS INDEX: 32.02 KG/M2 | WEIGHT: 174 LBS

## 2024-06-17 DIAGNOSIS — E55.9 VITAMIN D DEFICIENCY: ICD-10-CM

## 2024-06-17 DIAGNOSIS — H69.91 DYSFUNCTION OF RIGHT EUSTACHIAN TUBE: Primary | ICD-10-CM

## 2024-06-17 PROBLEM — R93.5 ABNORMAL CT OF THE ABDOMEN: Status: RESOLVED | Noted: 2024-03-29 | Resolved: 2024-06-17

## 2024-06-17 PROBLEM — R10.9 ABDOMINAL PAIN: Status: RESOLVED | Noted: 2024-03-29 | Resolved: 2024-06-17

## 2024-06-17 LAB — VIT D+METAB SERPL-MCNC: 25.2 NG/ML (ref 30–100)

## 2024-06-17 PROCEDURE — 82306 VITAMIN D 25 HYDROXY: CPT

## 2024-06-17 PROCEDURE — 36415 COLL VENOUS BLD VENIPUNCTURE: CPT

## 2024-06-17 PROCEDURE — G2211 COMPLEX E/M VISIT ADD ON: HCPCS

## 2024-06-17 PROCEDURE — 99214 OFFICE O/P EST MOD 30 MIN: CPT

## 2024-06-17 RX ORDER — SERTRALINE HYDROCHLORIDE 150 MG/1
CAPSULE ORAL
COMMUNITY

## 2024-06-20 NOTE — PROGRESS NOTES
Written by Maureen Valentin RN on 6/18/2024  9:30 AM CDT View Full Comments  Seen by patient Gale Latoya Link on 6/20/2024  5:12 AM

## 2024-06-21 ENCOUNTER — HOSPITAL ENCOUNTER (OUTPATIENT)
Age: 34
Discharge: HOME | End: 2024-06-21

## 2024-06-21 ENCOUNTER — APPOINTMENT (OUTPATIENT)
Dept: GENERAL RADIOLOGY | Age: 34
End: 2024-06-21
Attending: PHYSICIAN ASSISTANT

## 2024-06-21 VITALS
OXYGEN SATURATION: 98 % | WEIGHT: 175 LBS | HEART RATE: 81 BPM | SYSTOLIC BLOOD PRESSURE: 137 MMHG | HEIGHT: 62 IN | RESPIRATION RATE: 20 BRPM | TEMPERATURE: 98 F | BODY MASS INDEX: 32.2 KG/M2 | DIASTOLIC BLOOD PRESSURE: 79 MMHG

## 2024-06-21 DIAGNOSIS — S69.90XA FINGER INJURY: ICD-10-CM

## 2024-06-21 DIAGNOSIS — S62.664A CLOSED NONDISPLACED FRACTURE OF DISTAL PHALANX OF RIGHT RING FINGER, INITIAL ENCOUNTER: Primary | ICD-10-CM

## 2024-06-21 PROCEDURE — 73140 X-RAY EXAM OF FINGER(S): CPT | Performed by: PHYSICIAN ASSISTANT

## 2024-06-21 PROCEDURE — A4570 SPLINT: HCPCS | Performed by: PHYSICIAN ASSISTANT

## 2024-06-21 PROCEDURE — 99213 OFFICE O/P EST LOW 20 MIN: CPT | Performed by: PHYSICIAN ASSISTANT

## 2024-06-21 NOTE — ED PROVIDER NOTES
Patient Seen in: Immediate Care [facility]      History     Chief Complaint   Patient presents with    Fracture, Minor     Entered by patient    Arm or Hand Injury     Stated Complaint: Fracture, Minor    Subjective:   HPI    35 yo female presents to the IC due to right finger injury. Pt hyperextended her right ring finger today after the dog leash pulled on her finger. Noticed pain and swelling. Still maintains full ROM. No tingling or numbness. Icing the finger. No medications taken at home. Right hand dominant.     Objective:   Past Medical History:    Abdominal pain    Allergies    MOLD    Anxiety    Back pain    Belching    Bloating    Sometimes feeling pain and full    Constipation    Fatigue    Recent blood test confirmed very low Vitamin D    Flatulence/gas pain/belching    More often    Food intolerance    Change when I drink milk now as of proabbly june 2023    Gestational diabetes (HCC)    Gestational diabetes mellitus (HCC)    Headache disorder    Yes sometimes but now inky a hanful since I started sertraline    Heartburn    Started last year where i get in my sleep.I sleep better on my left rather than right    Heavy menses    Days 2-4    High cholesterol    Rexent blood test in jan 2024    Indigestion    Sometimes stomach cramping/bloat/gas    Menses painful    Occasionally    PTSD (post-traumatic stress disorder)    Sleep disturbance    Since 2015ish but worse the past year    Stress    Uncomfortable fullness after meals    Weight gain    At appt Oct/Nov that I am now 170lbs              Past Surgical History:   Procedure Laterality Date    [place] teeth removed  2007                Social History     Socioeconomic History    Marital status:    Tobacco Use    Smoking status: Never    Smokeless tobacco: Never   Vaping Use    Vaping status: Never Used   Substance and Sexual Activity    Alcohol use: Not Currently    Drug use: Never     Social Determinants of Health      Received from  [facility]              Review of Systems   Respiratory: Negative.     Cardiovascular: Negative.    Gastrointestinal: Negative.    Musculoskeletal:  Positive for joint swelling.   Skin: Negative.    Neurological: Negative.        Positive for stated complaint: Fracture, Minor  Other systems are as noted in HPI.  Constitutional and vital signs reviewed.      All other systems reviewed and negative except as noted above.    Physical Exam     ED Triage Vitals [06/21/24 1736]   /79   Pulse 81   Resp 20   Temp 97.6 °F (36.4 °C)   Temp src Temporal   SpO2 98 %   O2 Device None (Room air)       Current Vitals:   Vital Signs  BP: 137/79  Pulse: 81  Resp: 20  Temp: 97.6 °F (36.4 °C)  Temp src: Temporal    Oxygen Therapy  SpO2: 98 %  O2 Device: None (Room air)            Physical Exam  Vitals and nursing note reviewed.   Constitutional:       Appearance: Normal appearance.   Pulmonary:      Effort: Pulmonary effort is normal.   Musculoskeletal:      Comments: Right finger- bony tenderness over the distal phalanx and DIP joint. Full ROM but pain with extension with mild weakness. No other bony tenderness along the digits and metacarpals/MCP joints. Compartments are soft. Good cap refill and sensation is intact in all digits.    Skin:     General: Skin is warm.      Capillary Refill: Capillary refill takes less than 2 seconds.      Findings: No bruising or erythema.   Neurological:      General: No focal deficit present.      Mental Status: She is alert and oriented to person, place, and time.   Psychiatric:         Mood and Affect: Mood normal.         Behavior: Behavior normal.               ED Course   Labs Reviewed - No data to display          XR FINGER(S) (MIN 2 VIEWS), RIGHT 4TH (CPT=73140)    Result Date: 6/21/2024  PROCEDURE:  XR FINGER(S) (MIN 2 VIEWS), RIGHT 4TH (CPT=73140)  INDICATIONS:  Fracture, Minor  COMPARISON:  [facility], XR, XR FINGER(S) (MIN 2 VIEWS), RIGHT 2ND  (CPT=73140), 12/10/2022, 12:56 PM.  TECHNIQUE:  Three views of the finger were obtained.  PATIENT STATED HISTORY: (As transcribed by Technologist)  Pain of the 4th digit right hand, at the distal phalanx. Patients dog pulled her finger this morning.    FINDINGS:  A nondisplaced fracture involves the base of the 4th distal phalanx medially which does not appear to extend to the articular surface and is only visualized on a single image.  No dislocation.             CONCLUSION:  1. Findings most consistent with a nondisplaced extra-articular fracture involving the base of the 4th distal phalanx as detailed above, correlate clinically with point of maximal tenderness.   LOCATION:  [facility]   Dictated by (CST): Viky Mckinnon MD on 6/21/2024 at 5:59 PM     Finalized by (CST): Viky Mckinnon MD on 6/21/2024 at 6:00 PM               MDM   Ddx- finger sprain, finger fracture, ligamental injury       On exam the patient is in no acute distress. Edema and ecchymosis over the distal right ring finger, NVI full ROM of the DIP but painful extension. Xray independent review shows nondisplaced fracture of the Distal phalanx. Finger splint applied. Discussed rest, ice and elevation along with ibuprofen for pain. Discussed at lengths with patient at home care strict return precautions. All questions were answered and she is comfortable with DC home                              Medical Decision Making  Problems Addressed:  Closed nondisplaced fracture of distal phalanx of right ring finger, initial encounter: acute illness or injury  Finger injury: acute illness or injury    Amount and/or Complexity of Data Reviewed  Radiology: ordered and independent interpretation performed. Decision-making details documented in ED Course.    Risk  OTC drugs.        Disposition and Plan     Clinical Impression:  1. Closed nondisplaced fracture of distal phalanx of right ring finger, initial encounter    2. Finger injury          Disposition:  Discharge  6/21/2024  6:08 pm    Follow-up:  Kee Mandujano MD  [address] [place] [address] [address]  [facility] [id number] [phone]          Lesa Warren PA  [address] [facility] [address]  [facility] [address]  [phone]    Schedule an appointment as soon as possible for a visit in 1 week  If symptoms worsen          Medications Prescribed:  Discharge Medication List as of 6/21/2024  6:10 PM

## 2024-06-21 NOTE — DISCHARGE INSTRUCTIONS
Continue to rest, ice and elevate the hand  Continue to wear the splint  Ibuprofen and/or tylenol as needed  Follow up with hand specialist if symptoms worsen  Return to the ER if symptoms worsen

## 2024-10-21 ENCOUNTER — TELEPHONE (OUTPATIENT)
Dept: ORTHOPEDICS CLINIC | Facility: CLINIC | Age: 34
End: 2024-10-21

## 2024-10-21 ENCOUNTER — HOSPITAL ENCOUNTER (OUTPATIENT)
Age: 34
Discharge: HOME | End: 2024-10-21
Payer: COMMERCIAL

## 2024-10-21 ENCOUNTER — OFFICE VISIT (OUTPATIENT)
Dept: PODIATRY CLINIC | Facility: CLINIC | Age: 34
End: 2024-10-21

## 2024-10-21 ENCOUNTER — APPOINTMENT (OUTPATIENT)
Dept: GENERAL RADIOLOGY | Age: 34
End: 2024-10-21
Attending: NURSE PRACTITIONER
Payer: COMMERCIAL

## 2024-10-21 ENCOUNTER — TELEPHONE (OUTPATIENT)
Dept: PODIATRY CLINIC | Facility: CLINIC | Age: 34
End: 2024-10-21

## 2024-10-21 VITALS
SYSTOLIC BLOOD PRESSURE: 120 MMHG | DIASTOLIC BLOOD PRESSURE: 69 MMHG | OXYGEN SATURATION: 97 % | TEMPERATURE: 98 F | RESPIRATION RATE: 20 BRPM | HEART RATE: 82 BPM

## 2024-10-21 DIAGNOSIS — S92.031A CLOSED DISPLACED AVULSION FRACTURE OF TUBEROSITY OF RIGHT CALCANEUS, INITIAL ENCOUNTER: Primary | ICD-10-CM

## 2024-10-21 PROCEDURE — 73610 X-RAY EXAM OF ANKLE: CPT | Performed by: NURSE PRACTITIONER

## 2024-10-21 PROCEDURE — A9150 MISC/EXPER NON-PRESCRIPT DRU: HCPCS | Performed by: NURSE PRACTITIONER

## 2024-10-21 PROCEDURE — E0114 CRUTCH UNDERARM PAIR NO WOOD: HCPCS | Performed by: NURSE PRACTITIONER

## 2024-10-21 PROCEDURE — 73630 X-RAY EXAM OF FOOT: CPT | Performed by: NURSE PRACTITIONER

## 2024-10-21 PROCEDURE — 29515 APPLICATION SHORT LEG SPLINT: CPT | Performed by: NURSE PRACTITIONER

## 2024-10-21 PROCEDURE — 99213 OFFICE O/P EST LOW 20 MIN: CPT | Performed by: NURSE PRACTITIONER

## 2024-10-21 RX ORDER — LISDEXAMFETAMINE DIMESYLATE 60 MG/1
CAPSULE ORAL
COMMUNITY
Start: 2024-08-01

## 2024-10-21 RX ORDER — SERTRALINE HYDROCHLORIDE 200 MG/1
CAPSULE ORAL
COMMUNITY
Start: 2024-10-09

## 2024-10-21 RX ORDER — ACETAMINOPHEN 500 MG
1000 TABLET ORAL ONCE
Status: COMPLETED | OUTPATIENT
Start: 2024-10-21 | End: 2024-10-21

## 2024-10-21 NOTE — TELEPHONE ENCOUNTER
When can Dr Parish see this patient?  Thanks!      DOI 10/21/24    XR 10/21/24     Ankle  FINDINGS:  Subtle minimally displaced avulsion fracture noted.  This is favored to be of the lateral aspect of the calcaneus.  Follow-up radiographs are suggested.     Foot    FINDINGS:    BONES:  Normal.  No significant arthropathy or acute abnormality.   SOFT TISSUES:  Negative.  No visible soft tissue swelling.   EFFUSION:  None visible.   OTHER:  If clinical symptoms persist then recommend follow-up imaging.

## 2024-10-21 NOTE — PROGRESS NOTES
Reason for Visit      Gale Taylor is a 34 year old female presents today complaining of right foot injury.     History of Present Illness     Patient presents to clinic today after being seen in the emergency room for a right foot injury.  Patient states that she was out walking her dog when the dog pulled her and she lost her balance injuring her right foot.  Radiographs were taken which noted an avulsion fracture of the right calcaneus.  Patient was placed in a posterior short mold and told to follow-up with podiatry.  Patient states she has been putting weight on it to the right lower extremity in the posterior mold with crutches.  Patient denies any injury to any other body part at this time.  Patient did fall but did not hit her head or lose consciousness.    The following portions of the patient's history were reviewed and updated as appropriate: allergies, current medications, past family history, past medical history, past social history, past surgical history and problem list.    Allergies[1]      Current Outpatient Medications:     Sertraline HCl 150 MG Oral Cap, Take by mouth., Disp: , Rfl:     VYVANSE 50 MG Oral Cap, , Disp: , Rfl:     hydrOXYzine 25 MG Oral Tab, TAKE ONE TABLET DAILY AS NEEDED FOR ANXIETY. MAY TAKE 2 TABLETS NIGHTLY AS NEEDED FOR ANXIETY/SLEEP, Disp: , Rfl:     Omeprazole 40 MG Oral Capsule Delayed Release, Take 1 capsule (40 mg total) by mouth daily., Disp: 90 capsule, Rfl: 3    levocetirizine 5 MG Oral Tab, , Disp: , Rfl:     There are no discontinued medications.    Patient Active Problem List   Diagnosis    Gastroesophageal reflux disease    Migraine    Depression    Heartburn    Abdominal pain, left lower quadrant    Constipation    Benign neoplasm of ascending colon       Past Medical History:    Abdominal pain    Allergies    MOLD    Anxiety    Back pain    Belching    Bloating    Sometimes feeling pain and full    Constipation    Fatigue    Recent blood test confirmed very  low Vitamin D    Flatulence/gas pain/belching    More often    Food intolerance    Change when I drink milk now as of proabbly june 2023    Gestational diabetes (HCC)    Gestational diabetes mellitus (HCC)    Headache disorder    Yes sometimes but now inky a hanful since I started sertraline    Heartburn    Started last year where i get in my sleep.I sleep better on my left rather than right    Heavy menses    Days 2-4    High cholesterol    Rexent blood test in jan 2024    Indigestion    Sometimes stomach cramping/bloat/gas    Menses painful    Occasionally    PTSD (post-traumatic stress disorder)    Sleep disturbance    Since 2015ish but worse the past year    Stress    Uncomfortable fullness after meals    Weight gain    At appt Oct/Nov that I am now 170lbs       Past Surgical History:   Procedure Laterality Date    [place] teeth removed  2007       Family History   Problem Relation Age of Onset    Thyroid Disorder Mother     Breast Cancer Maternal Grandmother     Diabetes Maternal Grandmother         Yes    Stroke Maternal Grandmother     Heart Disease Maternal Grandfather     Diabetes Maternal Grandfather         Yes    Other (lymphoma) Maternal Grandfather        Social History     Occupational History    Not on file   Tobacco Use    Smoking status: Never    Smokeless tobacco: Never   Vaping Use    Vaping status: Never Used   Substance and Sexual Activity    Alcohol use: Not Currently    Drug use: Never    Sexual activity: Not on file       ROS      Constitutional: negative for chills, fevers and sweats  Gastrointestinal: negative for abdominal pain, diarrhea, nausea and vomiting  Genitourinary:negative for dysuria and hematuria  Musculoskeletal:negative for arthralgias and muscle weakness  Neurological: negative for paresthesia and weakness  All others reviewed and negative.      Physical Exam     LE PHYSICAL EXAM    Gen: A&O x3 and NAD  Constitution: Well-developed and well-nourished. Gait appears normal. No  apparent distress. Alert and oriented to person, place, and time.  Integument: There are no varicosities. Skin appears moist, warm, and supple with positive hair growth. There are no color changes. No open lesions. No macerations, No Hyperkeratotic lesions.  Vascular examination: Dorsalis pedis and posterior tibial pulses are strong bilaterally with capillary filling time less than 3 seconds to all digits. There is no peripheral edema..  Neurological Sensorium: Grossly intact to sharp/dull. Vibratory: Intact.  Musculoskeletal:   5/5 pedal muscle strength b/l     No pain with patient to the ATFL CFL PT FL.  Negative anterior drawer talar tilt test.  No pain along the peroneal tendon no distal fibula.  No pain with compression of syndesmosis.  The patient will patient to the deltoid ligament or medial maleolus. Muscle strength 5 out of 5 with dorsiflexion plantarflexion inversion against resistance.  Mild pain with eversion against resistance along the calcaneal tubercle on the lateral aspect.  Edema and ecchymosis noted to the lateral aspect of the right foot and ankle.  Neurovascular status intact to level of digits.  No calf pain noted.  No evidence of neurovascular compromise or significant edema noted       Vitals: LMP 01/06/2024 (Exact Date)         Assessment and Plan     Encounter Diagnoses   Name Primary?    Closed displaced avulsion fracture of tuberosity of right calcaneus, initial encounter Yes   Reviewed radiographs with patient in great detail and discussed her clinical presentation and radiographic means that is consistent for an avulsion fracture of the right calcaneus.  Discussed with patient given her mechanism of injury and physical exam she more than likely sustained some sort of lateral soft tissue component injury as well ranging from either a sprain to partial tear.  Discussed that we can obtain an MRI for further assessment but would not change her initial treatment modalities of protection  RICE and immobilization.  Discussed with patient she does not present with any sort of loss of function so no surgical intervention is warranted at this time other than just protection immobilization.  -Patient was fitted for a walking boot that she will wear consistently for 4 weeks.  -Note was written for work to work remotely as it is her right foot and she has a job where she is on her feet a significant amount  -Discussed RICE protection immobilization in great detail  -Patient to follow-up in 4 weeks or sooner if problems arise.    Patient was instructed to call the office or on-call podiatric physician immediately with any issues or concerns before the next scheduled visit. Patient to follow-up in clinic in 4 weeks      Nina Flores DPM, [email or web address] [email or web address]  Diplomat, American Board of Foot and Ankle Surgery  Certified in Foot and Rearfoot/Ankle Reconstruction  Fellow of the American College of Foot and Ankle Surgeons  Fellowship Trained Foot and Ankle Surgeon   [facility]     10/21/2024    12:33 PM         [1]   Allergies  Allergen Reactions    Mold ANGIOEDEMA    Seasonal UNKNOWN

## 2024-10-21 NOTE — ED PROVIDER NOTES
Patient Seen in: Immediate [facility]      History     Chief Complaint   Patient presents with    Leg or Foot Injury     Took dog out, pulled me a certain way and lost balance twisting my right foot. Iced after and throbbed more. Looks swollen comparing to other foot and hurts in weird places - Entered by patient     Stated Complaint: Right Leg or Foot Injury - Took dog out, pulled me a certain way and lost shazia*    Subjective:   This is a 34-year-old female with below stated medical history.  Presents to immediate care for right foot and ankle injury.  Reports she was walking her dog and her dog pulled her to the ground while she rolled her ankle.  Pain to the lateral ankle and the top of the foot.  States she did not hit her head or lose consciousness.  No neck or back pain.  No treatment attempted prior to arrival.    The history is provided by the patient.             Objective:     Past Medical History:    Abdominal pain    Allergies    MOLD    Anxiety    Back pain    Belching    Bloating    Sometimes feeling pain and full    Constipation    Fatigue    Recent blood test confirmed very low Vitamin D    Flatulence/gas pain/belching    More often    Food intolerance    Change when I drink milk now as of proabbly june 2023    Gestational diabetes (HCC)    Gestational diabetes mellitus (HCC)    Headache disorder    Yes sometimes but now inky a hanful since I started sertraline    Heartburn    Started last year where i get in my sleep.I sleep better on my left rather than right    Heavy menses    Days 2-4    High cholesterol    Rexent blood test in jan 2024    Indigestion    Sometimes stomach cramping/bloat/gas    Menses painful    Occasionally    PTSD (post-traumatic stress disorder)    Sleep disturbance    Since 2015ish but worse the past year    Stress    Uncomfortable fullness after meals    Weight gain    At appt Oct/Nov that I am now 170lbs              Past Surgical History:   Procedure  Laterality Date    [place] teeth removed  2007                Social History     Socioeconomic History    Marital status:    Tobacco Use    Smoking status: Never    Smokeless tobacco: Never   Vaping Use    Vaping status: Never Used   Substance and Sexual Activity    Alcohol use: Not Currently    Drug use: Never     Social Drivers of Health      [facility] [employer] [employer]              Review of Systems   Constitutional:  Negative for chills and fever.   HENT:  Negative for congestion and sore throat.    Respiratory:  Negative for cough.    Cardiovascular:  Negative for chest pain.   Gastrointestinal:  Negative for abdominal pain.   Genitourinary:  Negative for dysuria.   Musculoskeletal:  Positive for arthralgias and joint swelling. Negative for back pain, neck pain and neck stiffness.   Skin:  Negative for rash.   Neurological:  Negative for numbness and headaches.       Positive for stated complaint: Right Leg or Foot Injury - Took dog out, pulled me a certain way and lost shazia*  Other systems are as noted in HPI.  Constitutional and vital signs reviewed.      All other systems reviewed and negative except as noted above.    Physical Exam     ED Triage Vitals [10/21/24 1005]   /69   Pulse 82   Resp 20   Temp 98.2 °F (36.8 °C)   Temp src Temporal   SpO2 97 %   O2 Device None (Room air)       Current Vitals:   Vital Signs  BP: 120/69  Pulse: 82  Resp: 20  Temp: 98.2 °F (36.8 °C)  Temp src: Temporal    Oxygen Therapy  SpO2: 97 %  O2 Device: None (Room air)        Physical Exam  Vitals and nursing note reviewed.   Constitutional:       General: She is not in acute distress.     Appearance: Normal appearance. She is not ill-appearing, toxic-appearing or diaphoretic.   HENT:      Head: Normocephalic and atraumatic.      Right Ear: External ear normal.      Left Ear: External ear normal.      Nose: Nose normal.      Mouth/Throat:      Mouth: Mucous membranes are moist.       Pharynx: Oropharynx is clear.   Eyes:      General:         Right eye: No discharge.         Left eye: No discharge.      Extraocular Movements: Extraocular movements intact.      Conjunctiva/sclera: Conjunctivae normal.   Cardiovascular:      Rate and Rhythm: Normal rate.   Pulmonary:      Effort: Pulmonary effort is normal.   Musculoskeletal:      Cervical back: Neck supple.      Right lower leg: Normal. No edema.      Left lower leg: No edema.      Right ankle: Swelling present. No deformity, ecchymosis or lacerations. Tenderness present over the lateral malleolus. No proximal fibula tenderness. Decreased range of motion. Anterior drawer test negative. Normal pulse.      Right Achilles Tendon: Normal.      Right foot: Normal range of motion and normal capillary refill. Swelling, tenderness and bony tenderness present. No deformity or crepitus. Normal pulse.   Skin:     General: Skin is warm and dry.      Capillary Refill: Capillary refill takes less than 2 seconds.      Findings: No rash.   Neurological:      Mental Status: She is alert and oriented to person, place, and time.   Psychiatric:         Mood and Affect: Mood normal.         Behavior: Behavior normal.             ED Course   Labs Reviewed - No data to display         X-ray of the right foot and ankle.  Dose of Tylenol.        MDM      Vital signs stable.  Patient is well appearing and non toxic looking.  Presents to the IC for right foot and ankle injury.     Differential diagnosis includes but is no limited too sprain, contusion, cellulitis, fracture, arthritis, tendonitis    Tenderness and mild swelling across the lateral malleolus and the top of the lateral foot.  No obvious deformity or bruising.  CMS intact.    Xrays films reviewed and interpreted by myself. Results show avulsion fracture of calcaneus.    Patient was placed in short leg postmold in my presence on the right.  Neurovascular intact after placement.  Crutches given with instructions  to nonweightbearing.    Dc home. Rice instructions reviewed. Tylenol/ibuprofen for pain. Ortho podiatry follow up.  Patient verbalized understanding and agreed with plan of care.  All questions were answered.              Medical Decision Making      Disposition and Plan     Clinical Impression:  1. Closed nondisplaced avulsion fracture of tuberosity of right calcaneus, initial encounter         Disposition:  Discharge  10/21/2024 10:50 am    Follow-up:  Yudy Parish, DPM  [address] [place] [id number]  [phone]    In 1 week            Medications Prescribed:  Current Discharge Medication List              Supplementary Documentation:

## 2024-10-21 NOTE — TELEPHONE ENCOUNTER
Patient calling would like to know if she could be seen sooner than 10/23 due to fracture.Please advise    avulsion fracture of tuberosity of right calcaneus

## 2024-10-21 NOTE — DISCHARGE INSTRUCTIONS
Please keep temporary postmold in place.  Nonweightbearing.  Walk with crutches as instructed.  Rest, ice, elevate.  Tylenol and ibuprofen.  Podiatry follow-up as discussed.

## 2024-10-21 NOTE — TELEPHONE ENCOUNTER
LVM   If patient calls back   OR   Please attempt another call back....    Please put patient on for 30 minute appointment tomorrow at 3:15pm and ask patient to arrive 15 minutes early for XR.

## 2024-10-21 NOTE — TELEPHONE ENCOUNTER
Called patient and offered appointment today at 330pm with Dr Park at [facility]. Address provided. Cancelled 10/23 appointment.

## 2024-11-19 ENCOUNTER — HOSPITAL ENCOUNTER (OUTPATIENT)
Dept: GENERAL RADIOLOGY | Age: 34
Discharge: HOME | End: 2024-11-19
Attending: PODIATRIST
Payer: COMMERCIAL

## 2024-11-19 ENCOUNTER — OFFICE VISIT (OUTPATIENT)
Dept: PODIATRY CLINIC | Facility: CLINIC | Age: 34
End: 2024-11-19

## 2024-11-19 DIAGNOSIS — S92.031A CLOSED DISPLACED AVULSION FRACTURE OF TUBEROSITY OF RIGHT CALCANEUS, INITIAL ENCOUNTER: Primary | ICD-10-CM

## 2024-11-19 DIAGNOSIS — S92.031A CLOSED DISPLACED AVULSION FRACTURE OF TUBEROSITY OF RIGHT CALCANEUS, INITIAL ENCOUNTER: ICD-10-CM

## 2024-11-19 PROCEDURE — 73620 X-RAY EXAM OF FOOT: CPT | Performed by: PODIATRIST

## 2024-11-19 PROCEDURE — 99214 OFFICE O/P EST MOD 30 MIN: CPT | Performed by: PODIATRIST

## 2024-11-19 NOTE — PROGRESS NOTES
Reason for Visit      Gale Taylor is a 34 year old female presents today complaining of right foot injury.     History of Present Illness     Patient presents to clinic today after being seen in the emergency room for a right foot injury.  Patient states that she was out walking her dog when the dog pulled her and she lost her balance injuring her right foot.  Radiographs were taken which noted an avulsion fracture of the right calcaneus.  Patient was placed in a posterior short mold and told to follow-up with podiatry.  Patient states she has been putting weight on it to the right lower extremity in the posterior mold with crutches.  Patient denies any injury to any other body part at this time.  Patient did fall but did not hit her head or lose consciousness.    Patient returns to clinic today for 4-week follow-up of her right lower extremity.  Patient has been wearing flip-flops since her last office visit.  Patient states the pain is better but it still present.    The following portions of the patient's history were reviewed and updated as appropriate: allergies, current medications, past family history, past medical history, past social history, past surgical history and problem list.    Allergies[1]      Current Outpatient Medications:     Lisdexamfetamine Dimesylate 60 MG Oral Cap, , Disp: , Rfl:     Sertraline HCl 200 MG Oral Cap, , Disp: , Rfl:     hydrOXYzine 25 MG Oral Tab, TAKE ONE TABLET DAILY AS NEEDED FOR ANXIETY. MAY TAKE 2 TABLETS NIGHTLY AS NEEDED FOR ANXIETY/SLEEP, Disp: , Rfl:     Omeprazole 40 MG Oral Capsule Delayed Release, Take 1 capsule (40 mg total) by mouth daily., Disp: 90 capsule, Rfl: 3    levocetirizine 5 MG Oral Tab, , Disp: , Rfl:     There are no discontinued medications.    Patient Active Problem List   Diagnosis    Gastroesophageal reflux disease    Migraine    Depression    Heartburn    Abdominal pain, left lower quadrant    Constipation    Benign neoplasm of ascending  colon       Past Medical History:    Abdominal pain    Allergies    MOLD    Anxiety    Back pain    Belching    Bloating    Sometimes feeling pain and full    Constipation    Fatigue    Recent blood test confirmed very low Vitamin D    Flatulence/gas pain/belching    More often    Food intolerance    Change when I drink milk now as of proabbly june 2023    Gestational diabetes (HCC)    Gestational diabetes mellitus (HCC)    Headache disorder    Yes sometimes but now inky a hanful since I started sertraline    Heartburn    Started last year where i get in my sleep.I sleep better on my left rather than right    Heavy menses    Days 2-4    High cholesterol    Rexent blood test in jan 2024    Indigestion    Sometimes stomach cramping/bloat/gas    Menses painful    Occasionally    PTSD (post-traumatic stress disorder)    Sleep disturbance    Since 2015ish but worse the past year    Stress    Uncomfortable fullness after meals    Weight gain    At appt Oct/Nov that I am now 170lbs       Past Surgical History:   Procedure Laterality Date    [place] teeth removed  2007       Family History   Problem Relation Age of Onset    Thyroid Disorder Mother     Breast Cancer Maternal Grandmother     Diabetes Maternal Grandmother         Yes    Stroke Maternal Grandmother     Heart Disease Maternal Grandfather     Diabetes Maternal Grandfather         Yes    Other (lymphoma) Maternal Grandfather        Social History     Occupational History    Not on file   Tobacco Use    Smoking status: Never    Smokeless tobacco: Never   Vaping Use    Vaping status: Never Used   Substance and Sexual Activity    Alcohol use: Not Currently    Drug use: Never    Sexual activity: Not on file       ROS      Constitutional: negative for chills, fevers and sweats  Gastrointestinal: negative for abdominal pain, diarrhea, nausea and vomiting  Genitourinary:negative for dysuria and hematuria  Musculoskeletal:negative for arthralgias and muscle  weakness  Neurological: negative for paresthesia and weakness  All others reviewed and negative.      Physical Exam     LE PHYSICAL EXAM    Gen: A&O x3 and NAD  Constitution: Well-developed and well-nourished. Gait appears normal. No apparent distress. Alert and oriented to person, place, and time.  Integument: There are no varicosities. Skin appears moist, warm, and supple with positive hair growth. There are no color changes. No open lesions. No macerations, No Hyperkeratotic lesions.  Vascular examination: Dorsalis pedis and posterior tibial pulses are strong bilaterally with capillary filling time less than 3 seconds to all digits. There is no peripheral edema..  Neurological Sensorium: Grossly intact to sharp/dull. Vibratory: Intact.  Musculoskeletal:   5/5 pedal muscle strength b/l     No pain with patient to the ATFL CFL PT FL.  Negative anterior drawer talar tilt test.  No pain along the peroneal tendon no distal fibula.  No pain with compression of syndesmosis.  The patient will patient to the deltoid ligament or medial maleolus. Muscle strength 5 out of 5 with dorsiflexion plantarflexion inversion against resistance.  Mild pain with eversion against resistance along the calcaneal tubercle on the lateral aspect.  Edema and ecchymosis noted to the lateral aspect of the right foot and ankle.  Neurovascular status intact to level of digits.  No calf pain noted.  No evidence of neurovascular compromise or significant edema noted  Consistent pain on palpation to the calcaneus tuberosity on the right foot mild localized edema.  No pain with dorsiflexion plantarflexion inversion eversion against resistance.     Vitals: [facility] 01/06/2024 (Exact Date)         Assessment and Plan     Encounter Diagnoses   Name Primary?    Closed displaced avulsion fracture of tuberosity of right calcaneus, initial encounter Yes   Reviewed radiographs with patient in great detail and discussed her clinical presentation and radiographic  means that is consistent for an avulsion fracture of the right calcaneus.  Discussed with patient given her mechanism of injury and physical exam she more than likely sustained some sort of lateral soft tissue component injury as well ranging from either a sprain to partial tear.  Discussed that we can obtain an MRI for further assessment but would not change her initial treatment modalities of protection RICE and immobilization.  Discussed with patient she does not present with any sort of loss of function so no surgical intervention is warranted at this time other than just protection immobilization.  -Patient presents to clinic today in flip-flops.  Will order an MRI for further assessment.  Based on MRI findings may recommend that she resumes vitamin D versus bone stimulator versus better immobilization.    Patient was instructed to call the office or on-call podiatric physician immediately with any issues or concerns before the next scheduled visit. Patient to follow-up in clinic after MRI      Nina Flores DPM, D.LUKASZ STARKEY  Diplomat, American Board of Foot and Ankle Surgery  Certified in Foot and Rearfoot/Ankle Reconstruction  Fellow of the American College of Foot and Ankle Surgeons  Fellowship Trained Foot and Ankle Surgeon   [facility]     10/21/2024    12:33 PM         [1]   Allergies  Allergen Reactions    Mold ANGIOEDEMA    Seasonal UNKNOWN

## 2024-12-19 ENCOUNTER — HOSPITAL ENCOUNTER (OUTPATIENT)
Dept: MRI IMAGING | Facility: HOSPITAL | Age: 34
Discharge: HOME | End: 2024-12-19
Attending: PODIATRIST
Payer: COMMERCIAL

## 2024-12-19 DIAGNOSIS — S92.031A CLOSED DISPLACED AVULSION FRACTURE OF TUBEROSITY OF RIGHT CALCANEUS, INITIAL ENCOUNTER: ICD-10-CM

## 2024-12-30 ENCOUNTER — OFFICE VISIT (OUTPATIENT)
Dept: INTERNAL MEDICINE CLINIC | Facility: CLINIC | Age: 34
End: 2024-12-30
Payer: COMMERCIAL

## 2024-12-30 VITALS
HEIGHT: 62 IN | OXYGEN SATURATION: 99 % | TEMPERATURE: 97 F | DIASTOLIC BLOOD PRESSURE: 84 MMHG | SYSTOLIC BLOOD PRESSURE: 120 MMHG | HEART RATE: 84 BPM | RESPIRATION RATE: 18 BRPM | BODY MASS INDEX: 32.54 KG/M2 | WEIGHT: 176.81 LBS

## 2024-12-30 DIAGNOSIS — R10.32 ABDOMINAL PAIN, LEFT LOWER QUADRANT: ICD-10-CM

## 2024-12-30 DIAGNOSIS — Z00.00 LABORATORY EXAM ORDERED AS PART OF ROUTINE GENERAL MEDICAL EXAMINATION: ICD-10-CM

## 2024-12-30 DIAGNOSIS — K58.2 IRRITABLE BOWEL SYNDROME WITH BOTH CONSTIPATION AND DIARRHEA: ICD-10-CM

## 2024-12-30 DIAGNOSIS — Z12.11 SCREEN FOR COLON CANCER: ICD-10-CM

## 2024-12-30 DIAGNOSIS — K21.9 GASTROESOPHAGEAL REFLUX DISEASE, UNSPECIFIED WHETHER ESOPHAGITIS PRESENT: ICD-10-CM

## 2024-12-30 DIAGNOSIS — Z00.00 ROUTINE PHYSICAL EXAMINATION: Primary | ICD-10-CM

## 2024-12-30 PROBLEM — K59.00 CONSTIPATION: Status: RESOLVED | Noted: 2024-03-29 | Resolved: 2024-12-30

## 2024-12-30 PROBLEM — R12 HEARTBURN: Status: RESOLVED | Noted: 2024-03-29 | Resolved: 2024-12-30

## 2024-12-30 PROCEDURE — 99395 PREV VISIT EST AGE 18-39: CPT | Performed by: PHYSICIAN ASSISTANT

## 2024-12-30 NOTE — PROGRESS NOTES
Wellness Exam    CC: Patient is presenting for a wellness exam    HPI:   Concerns: LLQ pain, cyclical right before menses, excedrin or acetaminophen works best. Associated with bowel irregularities. Doesn't get pain with ovulation.   Had c-scope and endoscopy last year due to abnormal findings on CT scan. CT also showed moderate stool in colon.  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 Pt was started on omeprazole 40mg which she admits she only takes prn  Was also told to start fiber supplement and miralax, she did not start these.  Diet is general, exercise: not regular.    Gyne:     Health Maintenance   Topic Date Due    Pap Smear  06/15/2025         Denies pelvic pain, abnormal discharge or genital lesions.    Breast Cancer Screening:  No recommendations at this time .    Bone Health: Last DEXA: n/a   Colon cancer screening:    Health Maintenance   Topic Date Due    Colorectal Cancer Screening  Never done          Pertinent Family History:   Family History   Problem Relation Age of Onset    Thyroid Disorder Mother     Breast Cancer Maternal Grandmother     Diabetes Maternal Grandmother         Yes    Stroke Maternal Grandmother     Heart Disease Maternal Grandfather     Diabetes Maternal Grandfather         Yes    Other (lymphoma) Maternal Grandfather       Past Medical History:    Abdominal pain    Allergies    MOLD    Anxiety    Back pain    Belching    Bloating    Sometimes feeling pain and full    Constipation    Fatigue    Recent blood test confirmed very low Vitamin D    Flatulence/gas pain/belching    More often    Food intolerance    Change when I drink milk now as of proabbly june 2023    Gestational diabetes (HCC)    Gestational diabetes mellitus (HCC)    Headache disorder    Yes sometimes but now inky a hanful since I started sertraline    Heartburn    Started last year where i get in my sleep.I sleep better on my left rather than right     Heavy menses    Days 2-4    High cholesterol    Rexent blood test in jan 2024    Indigestion    Sometimes stomach cramping/bloat/gas    Menses painful    Occasionally    PTSD (post-traumatic stress disorder)    Sleep disturbance    Since 2015ish but worse the past year    Stress    Uncomfortable fullness after meals    Weight gain    At appt Oct/Nov that I am now 170lbs     Past Surgical History:   Procedure Laterality Date    [place] teeth removed  2007     Social History     Socioeconomic History    Marital status:    Tobacco Use    Smoking status: Never    Smokeless tobacco: Never   Vaping Use    Vaping status: Never Used   Substance and Sexual Activity    Alcohol use: Not Currently    Drug use: Never     Social Drivers of Health      [facility] from [employer] [employer]    [facility]     Medications Ordered Prior to Encounter[1]  Tobacco:  She has never smoked tobacco.       Review of Systems   Constitutional: Negative for fever, chills and fatigue.   HENT: Negative for hearing loss, congestion, sore throat and neck pain.    Eyes: Negative for pain and visual disturbance.   Respiratory: Negative for cough and shortness of breath.    Cardiovascular: Negative for chest pain and palpitations.   Gastrointestinal: Negative for nausea, vomiting, abdominal pain and diarrhea.   Genitourinary: Negative for urgency, frequency of urination, and abnormal vaginal bleeding.   Musculoskeletal: Negative for arthralgias and gait problem.   Skin: Negative for color change and rash.   Neurological: Negative for tremors, weakness and numbness.   Hematological: Negative for adenopathy. Does not bruise/bleed easily.   Psychiatric/Behavioral: Negative for confusion and agitation. The patient is not nervous/anxious.      /84   Pulse 84   Temp 97 °F (36.1 °C) (Temporal)   Resp 18   Ht 5' 2\" (1.575 m)   Wt 176 lb 12.8 oz (80.2 kg)   LMP 11/27/2024 (Exact Date)   SpO2 99%   BMI 32.34 kg/m²   Physical Exam    Constitutional: She is oriented to person, place, and time. She appears well-developed. No distress.    Head: Normocephalic and atraumatic.   Eyes: EOM are normal. Pupils are equal, round, and reactive to light. No scleral icterus.   ENT: TM's clear, nose normal, no oropharyngeal exudates or tonsillar hypertrophy    Neck: Normal range of motion. No thyromegaly present.   Cardiovascular: Normal rate, regular rhythm and normal heart sounds.  No murmur or friction rub heard.  Pulmonary/Chest: Effort normal and breath sounds normal bilaterally. She has no wheezes or rales.   Abdominal: Soft. Bowel sounds are normal. There is no tenderness. No HSM.  Musculoskeletal: Normal range of motion. She exhibits no edema.   Lymphadenopathy: She has no cervical, supraclavicular, or axillary adenopathy.   : defer to gyn  Neurological: She is alert and oriented to person, place, and time. DTRs are +2 and symmetric. Cranial nerves grossly intact.  Skin: Skin is warm. No rash noted. No erythema, pallor or jaundice.   Psychiatric: She has a normal mood and affect and her behavior is normal.     Assessment and Plan:  Gale Taylor is a 34 year old female here for a wellness exam.  Age appropriate cancer screening, labs, safety, immunizations were discussed with the patient and ordered as follows:  1. Routine physical examination (Primary)  2. Laboratory exam ordered as part of routine general medical examination  -     CBC With Differential With Platelet; Future; Expected date: 12/30/2024  -     Comp Metabolic Panel (14); Future; Expected date: 12/30/2024  -     Lipid Panel; Future; Expected date: 12/30/2024  -     TSH W Reflex To Free T4; Future; Expected date: 12/30/2024  -     Urinalysis, Routine; Future; Expected date: 12/30/2024  3. Screen for colon cancer  Repeat colonoscopy is due. Pt is aware and will schedule  4. Gastroesophageal reflux disease, unspecified whether esophagitis present  Continue omeprazole, f/up with GI as  scheduled  5. Abdominal pain, left lower quadrant  Seems related to constipation. Trial fiber supplement once daily and monitor.  6. Irritable bowel syndrome with both constipation and diarrhea   Fiber as above, monitor, f/up with GI.    Discussed use of sunscreen, wearing seatbelt, recommend regular cardiovascular and weight bearing exercise as well as a well-rounded diet.    Return in about 1 year (around 12/30/2025) for routine physical, or sooner as needed.     Patient/Caregiver Education:  Patient/Caregiver Education: There are no barriers to learning. Medical education done.  Outcome: Patient verbalizes understanding.       Educated by: CINDY       [1]   Current Outpatient Medications on File Prior to Visit   Medication Sig Dispense Refill    Lisdexamfetamine Dimesylate 60 MG Oral Cap       Sertraline HCl 200 MG Oral Cap       hydrOXYzine 25 MG Oral Tab TAKE ONE TABLET DAILY AS NEEDED FOR ANXIETY. MAY TAKE 2 TABLETS NIGHTLY AS NEEDED FOR ANXIETY/SLEEP      Omeprazole 40 MG Oral Capsule Delayed Release Take 1 capsule (40 mg total) by mouth daily. 90 capsule 3    levocetirizine 5 MG Oral Tab        No current facility-administered medications on file prior to visit.

## 2024-12-31 NOTE — PATIENT INSTRUCTIONS
Try psyllium husk fiber (start with one capsule daily) for left lower quadrant pain and to regulate the bowels     Hepatitis A vaccines recommended before [place]

## 2025-02-07 ENCOUNTER — TELEPHONE (OUTPATIENT)
Dept: INTERNAL MEDICINE CLINIC | Facility: CLINIC | Age: 35
End: 2025-02-07

## 2025-02-07 DIAGNOSIS — R10.32 LEFT LOWER QUADRANT ABDOMINAL PAIN: Primary | ICD-10-CM

## 2025-02-07 RX ORDER — METHYLPREDNISOLONE 4 MG/1
TABLET ORAL
Qty: 1 EACH | Refills: 0 | Status: SHIPPED | OUTPATIENT
Start: 2025-02-07

## 2025-02-07 NOTE — TELEPHONE ENCOUNTER
Patient is requesting an appt with Hayde - Pain in Lower left ABS - pain started yesterday  since yesterday

## 2025-02-07 NOTE — TELEPHONE ENCOUNTER
Denies any bowel changes, nausea or vomiting. Left lower quadrant and radiating up to upper left side of abdomen and ribs/side of stomach. She said yesterday afternoon pain started and was also experiencing back pain located in the upper back region. No fevers or respiratory symptoms. Pain is worse today to stomach and back and pain is described as sharp pain. No over the counter medications for symptoms. She says this pain is monthly now and sometimes occurs during same time as menstrual cycles. Near the end of cycle right now. Pain is always located on left side of abdomen. She says pain is the worst it has ever been she is tearful on phone due to the pain level. Patient states she has had work up with GI and her gyne and everything has been negative.    CINDY Ferguson: Medrol dose pack, add on over the counter Tylenol ES every six hours as needed. May apply heating pad as needed. Let us know how symptoms are beginning of next week. Pt also scheduled to see GI on Monday. April 10th patient scheduled for EGD/Colonoscopy. Advised on provider recommendations. Medrol pack sent to patient's preferred pharmacy.

## 2025-02-17 ENCOUNTER — OFFICE VISIT (OUTPATIENT)
Dept: INTERNAL MEDICINE CLINIC | Facility: CLINIC | Age: 35
End: 2025-02-17
Payer: COMMERCIAL

## 2025-02-17 VITALS
RESPIRATION RATE: 18 BRPM | BODY MASS INDEX: 31.01 KG/M2 | HEIGHT: 63 IN | SYSTOLIC BLOOD PRESSURE: 132 MMHG | OXYGEN SATURATION: 99 % | DIASTOLIC BLOOD PRESSURE: 78 MMHG | WEIGHT: 175 LBS | TEMPERATURE: 97 F | HEART RATE: 78 BPM

## 2025-02-17 DIAGNOSIS — H65.03 NON-RECURRENT ACUTE SEROUS OTITIS MEDIA OF BOTH EARS: Primary | ICD-10-CM

## 2025-02-17 PROCEDURE — 99213 OFFICE O/P EST LOW 20 MIN: CPT | Performed by: NURSE PRACTITIONER

## 2025-02-17 NOTE — PROGRESS NOTES
HPI:   Patient presents with symptoms of congestion, ear pressure, cough, pnd x 6 days  Current treatment includes tension migraine tylenol  Current Outpatient Medications   Medication Sig Dispense Refill    amoxicillin clavulanate 875-125 MG Oral Tab Take 1 tablet by mouth 2 (two) times daily for 10 days. 20 tablet 0    Lisdexamfetamine Dimesylate 60 MG Oral Cap       Sertraline HCl 200 MG Oral Cap       hydrOXYzine 25 MG Oral Tab TAKE ONE TABLET DAILY AS NEEDED FOR ANXIETY. MAY TAKE 2 TABLETS NIGHTLY AS NEEDED FOR ANXIETY/SLEEP      Omeprazole 40 MG Oral Capsule Delayed Release Take 1 capsule (40 mg total) by mouth daily. 90 capsule 3    levocetirizine 5 MG Oral Tab         Past Medical History:    Abdominal pain    Allergies    MOLD    Anxiety    Back pain    Belching    Bloating    Sometimes feeling pain and full    Constipation    Fatigue    Recent blood test confirmed very low Vitamin D    Flatulence/gas pain/belching    More often    Food intolerance    Change when I drink milk now as of proabbly june 2023    Gestational diabetes (HCC)    Gestational diabetes mellitus (HCC)    Headache disorder    Yes sometimes but now inky a hanful since I started sertraline    Heartburn    Started last year where i get in my sleep.I sleep better on my left rather than right    Heavy menses    Days 2-4    High cholesterol    Rexent blood test in jan 2024    Indigestion    Sometimes stomach cramping/bloat/gas    Menses painful    Occasionally    PTSD (post-traumatic stress disorder)    Sleep disturbance    Since 2015ish but worse the past year    Stress    Uncomfortable fullness after meals    Weight gain    At appt Oct/Nov that I am now 170lbs      Social History:  Social History     Socioeconomic History    Marital status:    Tobacco Use    Smoking status: Never    Smokeless tobacco: Never   Vaping Use    Vaping status: Never Used   Substance and Sexual Activity    Alcohol use: Not Currently    Drug use: Never      Social Drivers of Health      [facility]         REVIEW OF SYSTEMS:   GENERAL HEALTH: feels well otherwise  SKIN: denies any unusual skin lesions or rashes  RESPIRATORY: no shortness of breath with exertion  CARDIOVASCULAR: denies chest pain on exertion  GI: no nausea or vomiting  NEURO: +headaches    EXAM:   /78   Pulse 78   Temp 97 °F (36.1 °C) (Temporal)   Resp 18   Ht 5' 3\" (1.6 m)   Wt 175 lb (79.4 kg)   LMP 11/27/2024 (Exact Date)   SpO2 99%   BMI 31.00 kg/m²   GENERAL: well developed, well nourished,in no apparent distress  SKIN: no rashes,no suspicious lesions  ENT: bilateral tympanic membrane erythema, nasal mucosal edema, no sinus tenderness, uvula midline, oropharyngeal exudate and erythema     CV: RRR no murmur  PULM: cta b/l breathing is unlabored  GI: good BS's, non tender    ASSESSMENT AND PLAN:     Encounter Diagnosis   Name Primary?    Non-recurrent acute serous otitis media of both ears Yes        Requested Prescriptions     Signed Prescriptions Disp Refills    amoxicillin clavulanate 875-125 MG Oral Tab 20 tablet 0     Sig: Take 1 tablet by mouth 2 (two) times daily for 10 days.       Instructions given on increasing fluid intake  The patient indicates understanding of these issues and agrees to the plan.  The patient is asked to return in 3 days if not better. Call if fever or worsening symptoms.

## 2025-02-25 ENCOUNTER — OFFICE VISIT (OUTPATIENT)
Dept: INTERNAL MEDICINE CLINIC | Facility: CLINIC | Age: 35
End: 2025-02-25
Payer: COMMERCIAL

## 2025-02-25 VITALS
OXYGEN SATURATION: 100 % | SYSTOLIC BLOOD PRESSURE: 128 MMHG | DIASTOLIC BLOOD PRESSURE: 74 MMHG | TEMPERATURE: 97 F | RESPIRATION RATE: 16 BRPM | WEIGHT: 176 LBS | BODY MASS INDEX: 31.18 KG/M2 | HEART RATE: 81 BPM | HEIGHT: 63 IN

## 2025-02-25 DIAGNOSIS — R10.32 LLQ PAIN: ICD-10-CM

## 2025-02-25 DIAGNOSIS — H65.03 NON-RECURRENT ACUTE SEROUS OTITIS MEDIA OF BOTH EARS: Primary | ICD-10-CM

## 2025-02-25 PROCEDURE — 99214 OFFICE O/P EST MOD 30 MIN: CPT | Performed by: PHYSICIAN ASSISTANT

## 2025-02-25 RX ORDER — NAPROXEN 500 MG/1
500 TABLET ORAL 2 TIMES DAILY WITH MEALS
Qty: 14 TABLET | Refills: 0 | Status: SHIPPED | OUTPATIENT
Start: 2025-02-25

## 2025-02-25 RX ORDER — CEFDINIR 300 MG/1
300 CAPSULE ORAL 2 TIMES DAILY
Qty: 20 CAPSULE | Refills: 0 | Status: SHIPPED | OUTPATIENT
Start: 2025-02-25 | End: 2025-03-07

## 2025-02-25 NOTE — PROGRESS NOTES
HPI:   Pt on augmentin for b/l AOM, feels that R ear has been worsening past few days. On day 8 of treatment. Also taking tylenol not helping   Pain is intermittent, sharp/shooting   Denie fever, sore throat, cough    Also c/o LLQ pain 2 days ago when bending forward. Has not felt it since. Got some vaginal spotting today; period is due in a few days.  Current Outpatient Medications   Medication Sig Dispense Refill    cefdinir 300 MG Oral Cap Take 1 capsule (300 mg total) by mouth 2 (two) times daily for 10 days. 20 capsule 0    naproxen 500 MG Oral Tab Take 1 tablet (500 mg total) by mouth 2 (two) times daily with meals. 14 tablet 0    Lisdexamfetamine Dimesylate 60 MG Oral Cap Take 1 capsule (60 mg total) by mouth every morning.      Sertraline HCl 200 MG Oral Cap Take 200 mg by mouth daily.      hydrOXYzine 25 MG Oral Tab TAKE ONE TABLET DAILY AS NEEDED FOR ANXIETY. MAY TAKE 2 TABLETS NIGHTLY AS NEEDED FOR ANXIETY/SLEEP      levocetirizine 5 MG Oral Tab Take 1 tablet (5 mg total) by mouth every evening.        Past Medical History:    Abdominal pain    Allergies    MOLD    Anxiety    Back pain    Belching    Bloating    Sometimes feeling pain and full    Constipation    Fatigue    Recent blood test confirmed very low Vitamin D    Flatulence/gas pain/belching    More often    Food intolerance    Change when I drink milk now as of proabbly june 2023    Gestational diabetes (HCC)    Gestational diabetes mellitus (HCC)    Headache disorder    Yes sometimes but now inky a hanful since I started sertraline    Heartburn    Started last year where i get in my sleep.I sleep better on my left rather than right    Heavy menses    Days 2-4    High cholesterol    Rexent blood test in jan 2024    Indigestion    Sometimes stomach cramping/bloat/gas    Menses painful    Occasionally    PTSD (post-traumatic stress disorder)    Sleep disturbance    Since 2015ish but worse the past year    Stress    Uncomfortable fullness after  meals    Weight gain    At appt Oct/Nov that I am now 170lbs      Past Surgical History:   Procedure Laterality Date    Colonoscopy      [place] teeth removed  2007      Family History   Problem Relation Age of Onset    Thyroid Disorder Mother     Breast Cancer Maternal Grandmother     Diabetes Maternal Grandmother         Yes    Stroke Maternal Grandmother     Heart Disease Maternal Grandfather     Diabetes Maternal Grandfather         Yes    Other (lymphoma) Maternal Grandfather       Social History     Socioeconomic History    Marital status:    Tobacco Use    Smoking status: Never    Smokeless tobacco: Never   Vaping Use    Vaping status: Never Used   Substance and Sexual Activity    Alcohol use: Not Currently    Drug use: Never     Social Drivers of Health      Received from [employer] [employer]    [facility]         REVIEW OF SYSTEMS:   GENERAL: feeling poorly.   SKIN: no rashes  EYES: denies blurred vision, eye irritation or discharge  HEENT: congested; no difficulty swallowing or discharge from ears  LUNGS: denies shortness of breath, wheezing, hemoptysis  CARDIOVASCULAR: denies chest pain, palpitations or edema  GI: no nausea, vomiting or diarrhea  NEURO: denies dizziness, weakness or syncope    EXAM:   /74   Pulse 81   Temp 97.2 °F (36.2 °C)   Resp 16   Ht 5' 3\" (1.6 m)   Wt 176 lb (79.8 kg)   LMP 02/25/2025 (Exact Date)   SpO2 100%   BMI 31.18 kg/m²   GENERAL: well developed, well nourished,in no apparent distress  SKIN: no rashes, no suspicious lesions  EYES: PERRLA, EOMI, conjunctiva are clear  HEENT: atraumatic, normocephalic, TM's erythematous b/l with + serous effusion b/l R>L. TM are intact. Oropharynx without erythema, exudates or tonsillar hypertrophy  NECK: supple, no adenopathy, FROM  LUNGS: clear to auscultation b/l  CARDIO: RRR without murmur rub or gallop  GI: soft, non-distended and nontender, no CVA tenderness. No palpable mass.    ASSESSMENT AND PLAN:   1.  Non-recurrent acute serous otitis media of both ears (Primary)  Stop augmentin start omnicef and naproxen.   -     Cefdinir; Take 1 capsule (300 mg total) by mouth 2 (two) times daily for 10 days.  Dispense: 20 capsule; Refill: 0  -     Naproxen; Take 1 tablet (500 mg total) by mouth 2 (two) times daily with meals.  Dispense: 14 tablet; Refill: 0  2. LLQ pain  Possible ovarian cyst, no pain past 48h. Monitor, consider imaging if pain returns.    The patient indicates understanding of these issues and agrees to the plan.  The patient is asked to return if sx's persist or worsen.

## 2025-03-07 ENCOUNTER — OFFICE VISIT (OUTPATIENT)
Dept: INTERNAL MEDICINE CLINIC | Facility: CLINIC | Age: 35
End: 2025-03-07
Payer: COMMERCIAL

## 2025-03-07 VITALS
OXYGEN SATURATION: 99 % | WEIGHT: 176 LBS | BODY MASS INDEX: 31.18 KG/M2 | DIASTOLIC BLOOD PRESSURE: 78 MMHG | TEMPERATURE: 97 F | HEIGHT: 63 IN | HEART RATE: 77 BPM | SYSTOLIC BLOOD PRESSURE: 120 MMHG | RESPIRATION RATE: 18 BRPM

## 2025-03-07 DIAGNOSIS — H65.03 NON-RECURRENT ACUTE SEROUS OTITIS MEDIA OF BOTH EARS: Primary | ICD-10-CM

## 2025-03-07 DIAGNOSIS — B37.31 VULVOVAGINAL CANDIDIASIS: ICD-10-CM

## 2025-03-07 PROCEDURE — 99214 OFFICE O/P EST MOD 30 MIN: CPT | Performed by: PHYSICIAN ASSISTANT

## 2025-03-07 RX ORDER — FLUCONAZOLE 150 MG/1
150 TABLET ORAL
Qty: 2 TABLET | Refills: 0 | Status: SHIPPED | OUTPATIENT
Start: 2025-03-07

## 2025-03-07 RX ORDER — CEFDINIR 300 MG/1
300 CAPSULE ORAL 2 TIMES DAILY
Qty: 8 CAPSULE | Refills: 0 | Status: SHIPPED | OUTPATIENT
Start: 2025-03-07 | End: 2025-03-11

## 2025-03-07 RX ORDER — PREDNISONE 20 MG/1
40 TABLET ORAL DAILY
Qty: 10 TABLET | Refills: 0 | Status: SHIPPED | OUTPATIENT
Start: 2025-03-07 | End: 2025-03-12

## 2025-03-07 NOTE — PATIENT INSTRUCTIONS
Take fluconazole as directed over-the-counter. Use monistat cream (miconazole or clotrimazole over-the-counter) once a day as needed.  Finish cefdinir antibiotic  Start astelin nasal spray twice daily   In 3 days if ear doesn't feel better start prednisone as directed. Take with food.

## 2025-03-07 NOTE — PROGRESS NOTES
Gale Taylor is a 35 year old female.  HPI:   Follow up on AOM, R ear feels plugged, like it can't pop, painful. Gradually improved but still bothersome since last visit. L ear feeling better  C/o vaginal itching, discharge, feels like the skin is raw.   Current Outpatient Medications   Medication Sig Dispense Refill    cefdinir 300 MG Oral Cap Take 1 capsule (300 mg total) by mouth 2 (two) times daily for 4 days. 8 capsule 0    predniSONE 20 MG Oral Tab Take 2 tablets (40 mg total) by mouth daily for 5 days. 10 tablet 0    fluconazole 150 MG Oral Tab Take 1 tablet (150 mg total) by mouth every 3 (three) days. 2 tablet 0    Lisdexamfetamine Dimesylate 60 MG Oral Cap Take 1 capsule (60 mg total) by mouth every morning.      Sertraline HCl 200 MG Oral Cap Take 200 mg by mouth daily.      hydrOXYzine 25 MG Oral Tab TAKE ONE TABLET DAILY AS NEEDED FOR ANXIETY. MAY TAKE 2 TABLETS NIGHTLY AS NEEDED FOR ANXIETY/SLEEP      levocetirizine 5 MG Oral Tab Take 1 tablet (5 mg total) by mouth every evening.      cefdinir 300 MG Oral Cap Take 1 capsule (300 mg total) by mouth 2 (two) times daily for 10 days. (Patient not taking: Reported on 3/7/2025) 20 capsule 0    naproxen 500 MG Oral Tab Take 1 tablet (500 mg total) by mouth 2 (two) times daily with meals. (Patient not taking: Reported on 3/7/2025) 14 tablet 0      Past Medical History:    Abdominal pain    Allergies    MOLD    Anxiety    Back pain    Belching    Bloating    Sometimes feeling pain and full    Constipation    Fatigue    Recent blood test confirmed very low Vitamin D    Flatulence/gas pain/belching    More often    Food intolerance    Change when I drink milk now as of proabbly june 2023    Gestational diabetes (HCC)    Gestational diabetes mellitus (HCC)    Headache disorder    Yes sometimes but now inky a hanful since I started sertraline    Heartburn    Started last year where i get in my sleep.I sleep better on my left rather than right    Heavy menses     Days 2-4    High cholesterol    Rexent blood test in jan 2024    Indigestion    Sometimes stomach cramping/bloat/gas    Menses painful    Occasionally    PTSD (post-traumatic stress disorder)    Sleep disturbance    Since 2015ish but worse the past year    Stress    Uncomfortable fullness after meals    Weight gain    At appt Oct/Nov that I am now 170lbs      Social History:  Social History     Socioeconomic History    Marital status:    Tobacco Use    Smoking status: Never    Smokeless tobacco: Never   Vaping Use    Vaping status: Never Used   Substance and Sexual Activity    Alcohol use: Not Currently    Drug use: Never     Social Drivers of Health      [facility]        REVIEW OF SYSTEMS:   GENERAL HEALTH: feels well otherwise. Denies fever, chills, unintentional weight change  SKIN: denies any unusual skin lesions or rashes  RESPIRATORY: denies shortness of breath with exertion, denies cough or wheezing  CARDIOVASCULAR: denies chest pain or palpitations, denies leg swelling  GI: denies abdominal pain and denies heartburn. Denies nausea, vomiting, diarrhea, constipation  NEURO: denies headaches, dizziness, weakness, syncope    EXAM:   /78   Pulse 77   Temp 97.1 °F (36.2 °C) (Temporal)   Resp 18   Ht 5' 3\" (1.6 m)   Wt 176 lb (79.8 kg)   LMP 02/25/2025 (Exact Date)   SpO2 99%   BMI 31.18 kg/m²   GENERAL: well developed, well nourished,in no apparent distress  SKIN: no rashes,no suspicious lesions, warm and dry  HEENT: atraumatic, normocephalic, left TM + serous effusion no erythema, right TM dulled, erythema, + serous effusion. Oropharynx clear/moist  NECK: supple,no adenopathy, no thyromegaly  LUNGS: clear to auscultation b/l no W/R/R  CARDIO: RRR without murmur  GI: good BS's,no masses, HSM, distension or tenderness  EXTREMITIES: no cyanosis, clubbing or edema  MUSCULOSKELETAL: FROM, no joint swelling or bony tenderness  NEURO: a/ox3, no focal  deficits  PSYCH: mood and affect normal    ASSESSMENT AND PLAN:   1. Non-recurrent acute serous otitis media of both ears (Primary)  -     Cefdinir; Take 1 capsule (300 mg total) by mouth 2 (two) times daily for 4 days.  Dispense: 8 capsule; Refill: 0  -     predniSONE; Take 2 tablets (40 mg total) by mouth daily for 5 days.  Dispense: 10 tablet; Refill: 0  2. Vulvovaginal candidiasis  -     Fluconazole; Take 1 tablet (150 mg total) by mouth every 3 (three) days.  Dispense: 2 tablet; Refill: 0        The patient indicates understanding of these issues and agrees to the plan.  Return if symptoms worsen or fail to improve.

## 2025-04-14 ENCOUNTER — HOSPITAL ENCOUNTER (OUTPATIENT)
Age: 35
Discharge: HOME | End: 2025-04-14
Payer: COMMERCIAL

## 2025-04-14 VITALS
SYSTOLIC BLOOD PRESSURE: 139 MMHG | DIASTOLIC BLOOD PRESSURE: 85 MMHG | HEART RATE: 84 BPM | RESPIRATION RATE: 18 BRPM | OXYGEN SATURATION: 98 % | TEMPERATURE: 98 F

## 2025-04-14 DIAGNOSIS — J30.2 SEASONAL ALLERGIC RHINITIS, UNSPECIFIED TRIGGER: ICD-10-CM

## 2025-04-14 DIAGNOSIS — H92.03 OTALGIA OF BOTH EARS: Primary | ICD-10-CM

## 2025-04-14 PROCEDURE — 99213 OFFICE O/P EST LOW 20 MIN: CPT | Performed by: NURSE PRACTITIONER

## 2025-04-14 NOTE — ED INITIAL ASSESSMENT (HPI)
Pt c/o right ear pain.  Pt with history of ear infection and fluid in the ear.  Pt states symptoms returned

## 2025-04-14 NOTE — DISCHARGE INSTRUCTIONS
Make sure to stay well hydrated with clear fluids. Use Flonase daily and a long acting antihistamine such as Zyrtec or Allegra nightly. Using a humidifier in the bedroom at night, and sleeping propped up on pillows/somewhat of an incline can also be helpful. Cover your cough and wash your hands frequently to prevent the spread of infection. Follow up with your primary care provider or ENT in the next week. Seek additional care in the ER for fever that is not controlled with Tylenol and Motrin, difficulty breathing or shortness of breath, chest pain, or any new/worsening symptoms.

## 2025-04-14 NOTE — ED PROVIDER NOTES
Patient Seen in: [facility]    History   CC: ear pressure  HPI: Gale Taylor 35 year old female  who presents c/o bilat ear pressure, right greater than left, which started 1.5wks ago. States 2 mo ago she had ear infection for which she needed two rounds of abx and wants to ensure the infection has not returned. Denies URI s/s, fever, ear dx, hearing changes, injury/trauma. Last saw her ENT thru [facility] 2 yrs ago.     Past Medical History[1]    Past Surgical History[2]    Family History[3]    Short Social Hx on File[4]    ROS:  Systems reviewed: All pertinent positives noted in HPI. Unless otherwise noted, additional systems reviewed are negative.   Vital signs reviewed.    Positive for stated complaint: Ear Problem - Had Ear ached in Feb/march.1st med didn’t work,2nd med worked, I *  Other systems are as noted in HPI.  Constitutional and vital signs reviewed.      All other systems reviewed and negative except as noted above.    PSFH elements reviewed from today and agreed except as otherwise stated in HPI.             Constitutional and vital signs reviewed.        Physical Exam     ED Triage Vitals [04/14/25 1649]   /85   Pulse 84   Resp 18   Temp 98.2 °F (36.8 °C)   Temp src Oral   SpO2 98 %   O2 Device None (Room air)       Current:/85   Pulse 84   Temp 98.2 °F (36.8 °C) (Oral)   Resp 18   LMP 02/25/2025 (Exact Date)   SpO2 98%         PE:  General - Appears well, non-toxic and in NAD  Head - Appears symmetrical without deformity/swelling cranium, scalp, or facial bones  Eyes - sclera not injected, no discharge noted, no periorbital edema  ENT - EAC bilaterally without discharge, TM pearly grey with COL visualized appropriately bilaterally.   No tragal tenderness bilat  No mastoid erythema, swelling or tenderness bilat  no nasal drainage noted in nares bilat, no cobblestoning to post. Pharynx.   Oropharynx clear, posterior pharynx is without erythema and without tonsilar  enlargement or exudate, uvula midline, +gag, voice is clear. No trismus  No dental/gumline erythema or tenderness  Neck - no significant adenopathy, supple with trachea midline  Resp - Lung sounds clear bilaterally and wob unlabored, good aeration with equal, even expansion bilaterally   CV - RRR  Skin - no rashes or petechiae noted, pink warm and dry throughout, mmm, cap refill <2seconds  Neuro - A&O x4, steady gait  MSK - makes purposeful movements of all extremities, radial pulses 2+ bilat.  Psych - Interactive and appropriate      ED Course   Labs Reviewed - No data to display    MDM     DDx: aom, aoe, serous otitis, cerumen impaction, dental infection, seasonal rhinitis    Discussed with patient no sign of ear infection at this time.  Canals are patent.  History of seasonal allergies and states she has been sneezing lately.  Advised long-acting antihistamine such as Zyrtec/Allegra/Xyzal/Claritin nightly as well as intranasal Flonase, 1 spray into each nare twice daily reviewed.  Advised follow-up with ENT if symptoms are not improving in the next 1 week.  Return/ED precautions also reviewed.  Hydration instructions, rest, Tylenol or Motrin also reviewed. Patient is historian and demonstrates understanding of all instruction and agrees with plan of care.      Disposition and Plan     Clinical Impression:  1. Otalgia of both ears    2. Seasonal allergic rhinitis, unspecified trigger        Disposition:  Discharge    Follow-up:  Kee Mandujano MD  [address] [place] [address] [address]  [facility] [id number] [phone]    Go in 1 week  As needed    Ham Norwood DO  [address] [facility] [address]  [facility] [address]  [phone]    Go in 1 week  As needed    Horacio Graves MD  [address] [facility] [address]  [facility] [address]  [phone]    Go in 1 week  As needed      Medications Prescribed:  Discharge Medication List as of 4/14/2025  5:00 PM                         [1]   Past Medical History:   Abdominal pain    Allergies     MOLD    Anxiety    Back pain    Belching    Bloating    Sometimes feeling pain and full    Constipation    Fatigue    Recent blood test confirmed very low Vitamin D    Flatulence/gas pain/belching    More often    Food intolerance    Change when I drink milk now as of proabbly june 2023    Gestational diabetes (HCC)    Gestational diabetes mellitus (HCC)    Headache disorder    Yes sometimes but now inky a hanful since I started sertraline    Heartburn    Started last year where i get in my sleep.I sleep better on my left rather than right    Heavy menses    Days 2-4    High cholesterol    Rexent blood test in jan 2024    Indigestion    Sometimes stomach cramping/bloat/gas    Menses painful    Occasionally    PTSD (post-traumatic stress disorder)    Sleep disturbance    Since 2015ish but worse the past year    Stress    Uncomfortable fullness after meals    Weight gain    At appt Oct/Nov that I am now 170lbs   [2]   Past Surgical History:  Procedure Laterality Date    Colonoscopy      [place] teeth removed  2007   [3]   Family History  Problem Relation Age of Onset    Thyroid Disorder Mother     Breast Cancer Maternal Grandmother     Diabetes Maternal Grandmother         Yes    Stroke Maternal Grandmother     Heart Disease Maternal Grandfather     Diabetes Maternal Grandfather         Yes    Other (lymphoma) Maternal Grandfather    [4]   Social History  Socioeconomic History    Marital status:    Tobacco Use    Smoking status: Never    Smokeless tobacco: Never   Vaping Use    Vaping status: Never Used   Substance and Sexual Activity    Alcohol use: Not Currently    Drug use: Never     Social Drivers of Health      Received from [place]    [facility]

## 2025-05-01 ENCOUNTER — TELEPHONE (OUTPATIENT)
Dept: INTERNAL MEDICINE CLINIC | Facility: CLINIC | Age: 35
End: 2025-05-01

## 2025-05-01 NOTE — TELEPHONE ENCOUNTER
Patient is scheduled for abdominal xray through GI tomorrow, 5/2. Was scheduled for endo/colonoscopy for 4/30 but patient cancelled. Patient had to cancel due to work. She is now scheduled for June 11th.   Patient was at [employer] on Sunday and walked over 11,000 steps. She felt short of breath on Monday at rest and during exertion. She also was feeling body aches on Monday. She states she also found a lump on her right leg and it hurt yesterday to the touch but now it does not hurt. Advised patient I will forward this to CINDY Ferguson. Advised to proceed with scheduled abdominal xray.  RN will call back tomorrow with recommendations from provider.

## 2025-05-01 NOTE — TELEPHONE ENCOUNTER
Patient called the office complaining of pain in her L side, she states that last month there wasn't any pain but has flared up again.     Patient states that on Sunday, the pain flared up and she was holding her side (scored at 8/10) while she was with her family at [employer], she states that she was also experiencing headaches and shortness of breath. Patient states she has normally seen Marty or Misty for this.     She is requesting an appointment to be seen. Please call back regarding next steps.

## 2025-05-02 ENCOUNTER — HOSPITAL ENCOUNTER (OUTPATIENT)
Dept: GENERAL RADIOLOGY | Age: 35
Discharge: HOME | End: 2025-05-02
Payer: COMMERCIAL

## 2025-05-02 ENCOUNTER — HOSPITAL ENCOUNTER (OUTPATIENT)
Age: 35
Discharge: HOME | End: 2025-05-02
Payer: COMMERCIAL

## 2025-05-02 ENCOUNTER — APPOINTMENT (OUTPATIENT)
Dept: GENERAL RADIOLOGY | Age: 35
End: 2025-05-02
Attending: PHYSICIAN ASSISTANT
Payer: COMMERCIAL

## 2025-05-02 VITALS
WEIGHT: 170 LBS | TEMPERATURE: 98 F | RESPIRATION RATE: 17 BRPM | BODY MASS INDEX: 31.28 KG/M2 | DIASTOLIC BLOOD PRESSURE: 75 MMHG | OXYGEN SATURATION: 98 % | HEART RATE: 80 BPM | SYSTOLIC BLOOD PRESSURE: 122 MMHG | HEIGHT: 62 IN

## 2025-05-02 DIAGNOSIS — J98.4 PNEUMONITIS: ICD-10-CM

## 2025-05-02 DIAGNOSIS — R10.9 LEFT SIDED ABDOMINAL PAIN: ICD-10-CM

## 2025-05-02 DIAGNOSIS — R14.2 BELCHING: ICD-10-CM

## 2025-05-02 DIAGNOSIS — R06.02 SOB (SHORTNESS OF BREATH): Primary | ICD-10-CM

## 2025-05-02 DIAGNOSIS — R19.7 INTERMITTENT DIARRHEA: ICD-10-CM

## 2025-05-02 DIAGNOSIS — R19.8 IRREGULAR BOWEL HABITS: ICD-10-CM

## 2025-05-02 LAB
#MXD IC: 0.8 X10ˆ3/UL (ref 0.1–1)
BUN BLD-MCNC: 10 MG/DL (ref 7–18)
CHLORIDE BLD-SCNC: 105 MMOL/L (ref 98–112)
CO2 BLD-SCNC: 20 MMOL/L (ref 21–32)
CREAT BLD-MCNC: 0.7 MG/DL (ref 0.55–1.02)
DDIMER WHOLE BLOOD: <200 NG/ML DDU (ref ?–400)
EGFRCR SERPLBLD CKD-EPI 2021: 116 ML/MIN/1.73M2 (ref 60–?)
GLUCOSE BLD-MCNC: 93 MG/DL (ref 70–99)
HCT VFR BLD AUTO: 34.1 % (ref 35–48)
HCT VFR BLD CALC: 35 % (ref 34–50)
HGB BLD-MCNC: 10.7 G/DL (ref 12–16)
ISTAT IONIZED CALCIUM FOR CHEM 8: 1.04 MMOL/L (ref 1.12–1.32)
LYMPHOCYTES # BLD AUTO: 2.9 X10ˆ3/UL (ref 1–4)
LYMPHOCYTES NFR BLD AUTO: 25.8 %
MCH RBC QN AUTO: 24.1 PG (ref 26–34)
MCHC RBC AUTO-ENTMCNC: 31.4 G/DL (ref 31–37)
MCV RBC AUTO: 76.8 FL (ref 80–100)
MIXED CELL %: 7.1 %
NEUTROPHILS # BLD AUTO: 7.7 X10ˆ3/UL (ref 1.5–7.7)
NEUTROPHILS NFR BLD AUTO: 67.1 %
PLATELET # BLD AUTO: 441 X10ˆ3/UL (ref 150–450)
POTASSIUM BLD-SCNC: 3.7 MMOL/L (ref 3.6–5.1)
RBC # BLD AUTO: 4.44 X10ˆ6/UL (ref 3.8–5.3)
SODIUM BLD-SCNC: 138 MMOL/L (ref 136–145)
WBC # BLD AUTO: 11.4 X10ˆ3/UL (ref 4–11)

## 2025-05-02 PROCEDURE — 85025 COMPLETE CBC W/AUTO DIFF WBC: CPT | Performed by: PHYSICIAN ASSISTANT

## 2025-05-02 PROCEDURE — 93000 ELECTROCARDIOGRAM COMPLETE: CPT | Performed by: PHYSICIAN ASSISTANT

## 2025-05-02 PROCEDURE — 80047 BASIC METABLC PNL IONIZED CA: CPT | Performed by: PHYSICIAN ASSISTANT

## 2025-05-02 PROCEDURE — 71046 X-RAY EXAM CHEST 2 VIEWS: CPT | Performed by: PHYSICIAN ASSISTANT

## 2025-05-02 PROCEDURE — 85378 FIBRIN DEGRADE SEMIQUANT: CPT | Performed by: PHYSICIAN ASSISTANT

## 2025-05-02 PROCEDURE — 99214 OFFICE O/P EST MOD 30 MIN: CPT | Performed by: PHYSICIAN ASSISTANT

## 2025-05-02 PROCEDURE — 74019 RADEX ABDOMEN 2 VIEWS: CPT

## 2025-05-02 RX ORDER — AZITHROMYCIN 250 MG/1
TABLET, FILM COATED ORAL
Qty: 6 TABLET | Refills: 0 | Status: SHIPPED | OUTPATIENT
Start: 2025-05-02 | End: 2025-05-07

## 2025-05-02 RX ORDER — AMOXICILLIN 500 MG/1
1000 TABLET, FILM COATED ORAL 3 TIMES DAILY
Qty: 30 TABLET | Refills: 0 | Status: SHIPPED | OUTPATIENT
Start: 2025-05-02 | End: 2025-05-07

## 2025-05-02 NOTE — ED INITIAL ASSESSMENT (HPI)
Patient  was at [employer] on this past Sunday and walked over 11,000 steps.   Patient states short of breath on Monday at rest and during exertion with body aches.   Patient states found a lump on her right groin- and pain resolved.

## 2025-05-02 NOTE — TELEPHONE ENCOUNTER
Appt scheduled for Wednesday the 7th with CINDY Ferguson for evaluation of right leg pain and lump.

## 2025-05-02 NOTE — DISCHARGE INSTRUCTIONS
Continue follow-up with your primary care provider as planned on Wednesday.     Emergency Department for worsening symptoms.

## 2025-05-02 NOTE — ED PROVIDER NOTES
Patient Seen in: Immediate Care [facility]      History   No chief complaint on file.    Stated Complaint: Shrtness of breath    Subjective:   HPI    34 YO female with below stated PMHx presents to immediate care for evaluation of SOB and intermittent nonproductive cough starting 4 days ago. Endorses SOB even at rest. It feels hard to catch her breath. She denies exogenous estrogen use, recent surgeries, personal hx of of DVT/PE.  Denies chest pain.         Objective:     Past Medical History:    Abdominal pain    Allergies    MOLD    Anxiety    Back pain    Belching    Bloating    Sometimes feeling pain and full    Constipation    Fatigue    Recent blood test confirmed very low Vitamin D    Flatulence/gas pain/belching    More often    Food intolerance    Change when I drink milk now as of proabbly june 2023    Gestational diabetes (HCC)    Gestational diabetes mellitus (HCC)    Headache disorder    Yes sometimes but now inky a hanful since I started sertraline    Heartburn    Started last year where i get in my sleep.I sleep better on my left rather than right    Heavy menses    Days 2-4    High cholesterol    Rexent blood test in jan 2024    Indigestion    Sometimes stomach cramping/bloat/gas    Menses painful    Occasionally    PTSD (post-traumatic stress disorder)    Sleep disturbance    Since 2015ish but worse the past year    Stress    Uncomfortable fullness after meals    Weight gain    At appt Oct/Nov that I am now 170lbs              Past Surgical History:   Procedure Laterality Date    Colonoscopy      [place] teeth removed  2007                Social History     Socioeconomic History    Marital status:    Tobacco Use    Smoking status: Never     Passive exposure: Never    Smokeless tobacco: Never   Vaping Use    Vaping status: Never Used   Substance and Sexual Activity    Alcohol use: Not Currently    Drug use: Never     Social Drivers of Health      Received from [facility]    [facility]  Housing              Review of Systems    Positive for stated complaint: Shrtness of breath  Other systems are as noted in HPI.  Constitutional and vital signs reviewed.      All other systems reviewed and negative except as noted above.        Physical Exam     ED Triage Vitals [05/02/25 1730]   /75   Pulse 80   Resp 17   Temp 98.2 °F (36.8 °C)   Temp src Oral   SpO2 98 %   O2 Device None (Room air)       Current Vitals:   Vital Signs  BP: 122/75  Pulse: 80  Resp: 17  Temp: 98.2 °F (36.8 °C)  Temp src: Oral    Oxygen Therapy  SpO2: 98 %  O2 Device: None (Room air)        Physical Exam  Vitals and nursing note reviewed.   Constitutional:       General: She is not in acute distress.     Appearance: Normal appearance. She is not ill-appearing, toxic-appearing or diaphoretic.   Cardiovascular:      Rate and Rhythm: Normal rate.      Heart sounds: Normal heart sounds.   Pulmonary:      Effort: Pulmonary effort is normal. No respiratory distress.      Breath sounds: Normal breath sounds. No wheezing.   Neurological:      Mental Status: She is alert and oriented to person, place, and time.   Psychiatric:         Behavior: Behavior normal.           ED Course     Labs Reviewed   POCT CBC - Abnormal; Notable for the following components:       Result Value    WBC IC 11.4 (*)     HGB IC 10.7 (*)     HCT IC 34.1 (*)     MCV IC 76.8 (*)     MCH IC 24.1 (*)     All other components within normal limits   POCT ISTAT CHEM8 CARTRIDGE - Abnormal; Notable for the following components:    ISTAT Ionized Calcium 1.04 (*)     ISTAT TCO2 20 (*)     All other components within normal limits   D-DIMER (POC) - Normal     XR CHEST PA + LAT CHEST (HJB=40431)  Result Date: 5/2/2025  PROCEDURE:  XR CHEST PA + LAT CHEST (CPT=71046)  INDICATIONS:  SOB  COMPARISON:  None.  TECHNIQUE:  PA and lateral chest radiographs were obtained.  PATIENT STATED HISTORY: (As transcribed by Technologist)  Intermittent SOB for 4 days.    FINDINGS:  Lung  volumes are satisfactory.  Mild increased markings are seen in the right mid lung.  No pleural effusion.  Normal cardiomediastinal contour.               CONCLUSION:  Mild increased markings in the right mid lung.  These may be any combination of scarring and or pneumonitis/pneumonia.  Continued clinical correlation recommended.   LOCATION:  [facility]   Dictated by (CST): Homero Velázquez MD on 5/02/2025 at 6:09 PM     Finalized by (CST): Homero Velázquez MD on 5/02/2025 at 6:10 PM       EEG with normal sinus rhythm, low voltage QRS.  Ventricular rate 67 bpm.    MDM      Differential diagnosis considered but not limited to viral URI, pneumonia, cardiac arrhythmia, PE    ECG with normal sinus rhythm, low voltage QRS, no acute ischemic changes.  CBC: elevated WBC 11.4, decreased HGB, hematocrit, MCV, MCH. Chem8 is not suggestive.   D-dimer negative.   Chest x-ray remarkable for mild increased markings to right midlung, scarring versus pneumonitis/pneumonia. Given mild leukocytosis and recent SOB, will treat as pneumonia with amoxicillin and Z-Huber. Patient to continue follow-up as planned with PCP on Wednesday.  Return for worsening symptoms.        Medical Decision Making  Amount and/or Complexity of Data Reviewed  Labs: ordered. Decision-making details documented in ED Course.  Radiology: ordered and independent interpretation performed. Decision-making details documented in ED Course.    Risk  Prescription drug management.        Disposition and Plan     Clinical Impression:  1. SOB (shortness of breath)    2. Pneumonitis         Disposition:  Discharge  5/2/2025  6:53 pm    Follow-up:  No follow-up provider specified.        Medications Prescribed:  Discharge Medication List as of 5/2/2025  6:57 PM        START taking these medications    Details   amoxicillin 500 MG Oral Tab Take 2 tablets (1,000 mg total) by mouth in the morning, at noon, and at bedtime for 5 days., Normal, Disp-30 tablet, R-0      azithromycin (ZITHROMAX  Z-FATOUMATA) 250 MG Oral Tab 500 mg once followed by 250 mg daily x 4 days, Normal, Disp-6 tablet, R-0             Supplementary Documentation:

## 2025-05-03 LAB
ATRIAL RATE: 67 BPM
P AXIS: 28 DEGREES
P-R INTERVAL: 114 MS
Q-T INTERVAL: 374 MS
QRS DURATION: 70 MS
QTC CALCULATION (BEZET): 395 MS
R AXIS: 45 DEGREES
T AXIS: 29 DEGREES
VENTRICULAR RATE: 67 BPM

## 2025-05-07 ENCOUNTER — OFFICE VISIT (OUTPATIENT)
Dept: INTERNAL MEDICINE CLINIC | Facility: CLINIC | Age: 35
End: 2025-05-07
Payer: COMMERCIAL

## 2025-05-07 ENCOUNTER — LAB ENCOUNTER (OUTPATIENT)
Dept: LAB | Age: 35
End: 2025-05-07
Attending: PHYSICIAN ASSISTANT
Payer: COMMERCIAL

## 2025-05-07 VITALS
WEIGHT: 178 LBS | SYSTOLIC BLOOD PRESSURE: 122 MMHG | BODY MASS INDEX: 32.76 KG/M2 | RESPIRATION RATE: 18 BRPM | TEMPERATURE: 98 F | HEIGHT: 62 IN | OXYGEN SATURATION: 98 % | DIASTOLIC BLOOD PRESSURE: 72 MMHG | HEART RATE: 78 BPM

## 2025-05-07 DIAGNOSIS — J18.9 COMMUNITY ACQUIRED PNEUMONIA OF RIGHT MIDDLE LOBE OF LUNG: Primary | ICD-10-CM

## 2025-05-07 DIAGNOSIS — R59.0 INGUINAL LYMPHADENOPATHY: ICD-10-CM

## 2025-05-07 DIAGNOSIS — Z00.00 LABORATORY EXAM ORDERED AS PART OF ROUTINE GENERAL MEDICAL EXAMINATION: ICD-10-CM

## 2025-05-07 LAB
ALBUMIN SERPL-MCNC: 4.7 G/DL (ref 3.2–4.8)
ALBUMIN/GLOB SERPL: 1.8 {RATIO} (ref 1–2)
ALP LIVER SERPL-CCNC: 71 U/L (ref 37–98)
ALT SERPL-CCNC: 11 U/L (ref 10–49)
ANION GAP SERPL CALC-SCNC: 14 MMOL/L (ref 0–18)
AST SERPL-CCNC: 13 U/L (ref ?–34)
BASOPHILS # BLD AUTO: 0.05 X10(3) UL (ref 0–0.2)
BASOPHILS NFR BLD AUTO: 0.7 %
BILIRUB SERPL-MCNC: 0.3 MG/DL (ref 0.3–1.2)
BUN BLD-MCNC: 11 MG/DL (ref 9–23)
CALCIUM BLD-MCNC: 9.4 MG/DL (ref 8.7–10.6)
CHLORIDE SERPL-SCNC: 102 MMOL/L (ref 98–112)
CHOLEST SERPL-MCNC: 238 MG/DL (ref ?–200)
CO2 SERPL-SCNC: 24 MMOL/L (ref 21–32)
CREAT BLD-MCNC: 0.92 MG/DL (ref 0.55–1.02)
EGFRCR SERPLBLD CKD-EPI 2021: 83 ML/MIN/1.73M2 (ref 60–?)
EOSINOPHIL # BLD AUTO: 0.07 X10(3) UL (ref 0–0.7)
EOSINOPHIL NFR BLD AUTO: 0.9 %
ERYTHROCYTE [DISTWIDTH] IN BLOOD BY AUTOMATED COUNT: 16.2 %
FASTING PATIENT LIPID ANSWER: YES
FASTING STATUS PATIENT QL REPORTED: YES
GLOBULIN PLAS-MCNC: 2.6 G/DL (ref 2–3.5)
GLUCOSE BLD-MCNC: 120 MG/DL (ref 70–99)
HCT VFR BLD AUTO: 36.3 % (ref 35–48)
HDLC SERPL-MCNC: 37 MG/DL (ref 40–59)
HGB BLD-MCNC: 11 G/DL (ref 12–16)
IMM GRANULOCYTES # BLD AUTO: 0.03 X10(3) UL (ref 0–1)
IMM GRANULOCYTES NFR BLD: 0.4 %
LDLC SERPL CALC-MCNC: 172 MG/DL (ref ?–100)
LYMPHOCYTES # BLD AUTO: 2.19 X10(3) UL (ref 1–4)
LYMPHOCYTES NFR BLD AUTO: 29 %
MCH RBC QN AUTO: 24.4 PG (ref 26–34)
MCHC RBC AUTO-ENTMCNC: 30.3 G/DL (ref 31–37)
MCV RBC AUTO: 80.7 FL (ref 80–100)
MONOCYTES # BLD AUTO: 0.53 X10(3) UL (ref 0.1–1)
MONOCYTES NFR BLD AUTO: 7 %
NEUTROPHILS # BLD AUTO: 4.67 X10 (3) UL (ref 1.5–7.7)
NEUTROPHILS # BLD AUTO: 4.67 X10(3) UL (ref 1.5–7.7)
NEUTROPHILS NFR BLD AUTO: 62 %
NONHDLC SERPL-MCNC: 201 MG/DL (ref ?–130)
OSMOLALITY SERPL CALC.SUM OF ELEC: 291 MOSM/KG (ref 275–295)
PLATELET # BLD AUTO: 414 10(3)UL (ref 150–450)
POTASSIUM SERPL-SCNC: 4.2 MMOL/L (ref 3.5–5.1)
PROT SERPL-MCNC: 7.3 G/DL (ref 5.7–8.2)
RBC # BLD AUTO: 4.5 X10(6)UL (ref 3.8–5.3)
SODIUM SERPL-SCNC: 140 MMOL/L (ref 136–145)
TRIGL SERPL-MCNC: 158 MG/DL (ref 30–149)
TSI SER-ACNC: 1.92 UIU/ML (ref 0.55–4.78)
VLDLC SERPL CALC-MCNC: 32 MG/DL (ref 0–30)
WBC # BLD AUTO: 7.5 X10(3) UL (ref 4–11)

## 2025-05-07 PROCEDURE — 80053 COMPREHEN METABOLIC PANEL: CPT

## 2025-05-07 PROCEDURE — 99214 OFFICE O/P EST MOD 30 MIN: CPT | Performed by: PHYSICIAN ASSISTANT

## 2025-05-07 PROCEDURE — 85025 COMPLETE CBC W/AUTO DIFF WBC: CPT

## 2025-05-07 PROCEDURE — 84443 ASSAY THYROID STIM HORMONE: CPT

## 2025-05-07 PROCEDURE — 80061 LIPID PANEL: CPT

## 2025-05-07 PROCEDURE — 36415 COLL VENOUS BLD VENIPUNCTURE: CPT

## 2025-05-07 RX ORDER — OMEPRAZOLE 40 MG/1
40 CAPSULE, DELAYED RELEASE ORAL DAILY
COMMUNITY
Start: 2025-03-24

## 2025-05-07 RX ORDER — PROPRANOLOL HCL 20 MG
20 TABLET ORAL EVERY 6 HOURS PRN
COMMUNITY
Start: 2025-04-21

## 2025-05-07 NOTE — PATIENT INSTRUCTIONS
In 1 month: complete chest x-ray, on or after 6/2.  Contact us if onset of cough, shortness of breath, wheezing or fever.  Monitor lymph node and follow up with gynecologist

## 2025-05-07 NOTE — PROGRESS NOTES
Gale Taylor is a 35 year old female.  HPI:   Patient following up on CAP diagnosis from urgent care on 5/2.   Did not feel comfortable with the experience, did not start the amoxicillin and zpak that were prescribed. However next morning woke up with symptoms improved. Today she states no further shortness of breath, cough or fatigue.    Also c/o tender lump on L groin x 1 week. Was bigger, has been going down in size. Feels this came on during PMS time. Denies vaginal discharge, dysuria, recent wound in leg or groin area or skin lesions  Current Medications[1]   Past Medical History[2]   Social History:  Short Social Hx on File[3]     REVIEW OF SYSTEMS:   GENERAL HEALTH: feels well otherwise. Denies fever, chills, unintentional weight change  SKIN: denies any unusual skin lesions or rashes  RESPIRATORY: denies shortness of breath with exertion, denies cough or wheezing  CARDIOVASCULAR: denies chest pain or palpitations, denies leg swelling  GI: denies abdominal pain and denies heartburn. Denies nausea, vomiting, diarrhea, constipation  NEURO: denies headaches, dizziness, weakness, syncope    EXAM:   /72   Pulse 78   Temp 98 °F (36.7 °C)   Resp 18   Ht 5' 2\" (1.575 m)   Wt 178 lb (80.7 kg)   LMP 05/03/2025 (Exact Date)   SpO2 98%   BMI 32.56 kg/m²   GENERAL: well developed, well nourished,in no apparent distress  SKIN: no rashes,no suspicious lesions, warm and dry  HEENT: atraumatic, normocephalic,ears and throat are clear  NECK: supple,no adenopathy, no thyromegaly  LUNGS: clear to auscultation b/l no W/R/R  CARDIO: RRR without murmur  GI: good BS's,no masses, HSM, distension or tenderness  : left inguinal 1.5x0.5cm tender lymph node, mobile, no surrounding enlarged lymph nodes, no palpable mass or hernia  EXTREMITIES: no cyanosis, clubbing or edema  NEURO: a/ox3, no focal deficits  PSYCH: mood and affect normal    ASSESSMENT AND PLAN:   1. Community acquired pneumonia of right middle lobe of  lung (Primary)  Symptoms resolved now without treatment. Patient will contact us right away if symptoms return. Advised recheck CXR in 1 month to ensure resolution of patchy interstitial markings R middle lung.   -     XR CHEST PA + LAT CHEST (CPT=71046); Future; Expected date: 06/02/2025  2. Inguinal lymphadenopathy   Improving. C/w reactive lymph node. Monitor, see gyn for f/up if persists.     The patient indicates understanding of these issues and agrees to the plan.  No follow-ups on file.        [1]   Current Outpatient Medications   Medication Sig Dispense Refill    propranolol 20 MG Oral Tab Take 1 tablet (20 mg total) by mouth every 6 (six) hours as needed.      Omeprazole 40 MG Oral Capsule Delayed Release Take 1 capsule (40 mg total) by mouth daily.      amoxicillin 500 MG Oral Tab Take 2 tablets (1,000 mg total) by mouth in the morning, at noon, and at bedtime for 5 days. 30 tablet 0    azithromycin (ZITHROMAX Z-FATOUMATA) 250 MG Oral Tab 500 mg once followed by 250 mg daily x 4 days 6 tablet 0    Sertraline HCl 200 MG Oral Cap Take 200 mg by mouth in the morning.     [2]   Past Medical History:   Abdominal pain    Allergies    MOLD    Anxiety    Back pain    Belching    Bloating    Sometimes feeling pain and full    Constipation    Fatigue    Recent blood test confirmed very low Vitamin D    Flatulence/gas pain/belching    More often    Food intolerance    Change when I drink milk now as of proabbly june 2023    Gestational diabetes (HCC)    Gestational diabetes mellitus (HCC)    Headache disorder    Yes sometimes but now inky a hanful since I started sertraline    Heartburn    Started last year where i get in my sleep.I sleep better on my left rather than right    Heavy menses    Days 2-4    High cholesterol    Rexent blood test in jan 2024    Indigestion    Sometimes stomach cramping/bloat/gas    Menses painful    Occasionally    PTSD (post-traumatic stress disorder)    Sleep disturbance    Since 2015ish  but worse the past year    Stress    Uncomfortable fullness after meals    Weight gain    At appt Oct/Nov that I am now 170lbs   [3]   Social History  Socioeconomic History    Marital status:    Tobacco Use    Smoking status: Never     Passive exposure: Never    Smokeless tobacco: Never   Vaping Use    Vaping status: Never Used   Substance and Sexual Activity    Alcohol use: Not Currently    Drug use: Never     Social Drivers of Health      Received from [facility]

## 2025-05-08 NOTE — PROGRESS NOTES
Only Ferritin can be added to existing specimen,   Routed to Marty Melgar PA-C, Please advise if you would like Patient to go back to lab for anemia panel

## (undated) NOTE — LETTER
10/21/2024          To Whom It May Concern:    Gale Taylor is currently under my medical care.  It is in my professional opinion the patient should work remotely for the next 4 weeks due to his significant orthopedic condition of her right lower extremity preventing her from driving or ambulating long distances.  Updates and patient's restrictions will occur at her follow-up appointment in 4 weeks.  Patient is required to wear a walking boot at all times.    If you require additional information please contact our office.        Sincerely,      Nina Flores DPM, D.LUKASZ STARKEY  Diplomat, American Board of Foot and Ankle Surgery  Certified in Foot and Rearfoot/Ankle Reconstruction  Fellow of the American College of Foot and Ankle Surgeons  Fellowship Trained Foot and Ankle Surgeon   [facility]

## (undated) NOTE — LETTER
Date & Time: 11/16/2023, 1:14 PM  Patient: Negrito Bhatt  Encounter Provider(s):    Rakesh Mcgarry June, APRNISHI       To Whom It May Concern: Jose Em was seen and treated in our department on 11/16/2023. She can return to work with these limitations: right handed duty only x 2 weeks .     If you have any questions or concerns, please do not hesitate to call.        _____________________________  Physician/APC Signature

## (undated) NOTE — LETTER
Date: 11/10/2023    Patient Name: Charo Coronado          To Whom it may concern: This letter has been written at the patient's request. The above patient was seen at the [facility] for treatment of a medical condition.       Sincerely,      FREDDIE Erazo